# Patient Record
Sex: MALE | Race: BLACK OR AFRICAN AMERICAN | NOT HISPANIC OR LATINO | Employment: FULL TIME | URBAN - METROPOLITAN AREA
[De-identification: names, ages, dates, MRNs, and addresses within clinical notes are randomized per-mention and may not be internally consistent; named-entity substitution may affect disease eponyms.]

---

## 2017-01-03 ENCOUNTER — LAB VISIT (OUTPATIENT)
Dept: LAB | Facility: HOSPITAL | Age: 42
End: 2017-01-03
Attending: INTERNAL MEDICINE
Payer: COMMERCIAL

## 2017-01-03 DIAGNOSIS — M25.50 POLYARTHRALGIA: ICD-10-CM

## 2017-01-03 LAB
ALBUMIN SERPL BCP-MCNC: 3.1 G/DL
ALP SERPL-CCNC: 74 U/L
ALT SERPL W/O P-5'-P-CCNC: 11 U/L
ANION GAP SERPL CALC-SCNC: 9 MMOL/L
AST SERPL-CCNC: 8 U/L
BASOPHILS # BLD AUTO: 0.02 K/UL
BASOPHILS NFR BLD: 0.3 %
BILIRUB SERPL-MCNC: 0.6 MG/DL
BUN SERPL-MCNC: 14 MG/DL
CALCIUM SERPL-MCNC: 9.2 MG/DL
CHLORIDE SERPL-SCNC: 104 MMOL/L
CO2 SERPL-SCNC: 25 MMOL/L
CREAT SERPL-MCNC: 0.8 MG/DL
CRP SERPL-MCNC: 10.3 MG/L
DIFFERENTIAL METHOD: ABNORMAL
EOSINOPHIL # BLD AUTO: 0.2 K/UL
EOSINOPHIL NFR BLD: 2.1 %
ERYTHROCYTE [DISTWIDTH] IN BLOOD BY AUTOMATED COUNT: 16.4 %
ERYTHROCYTE [SEDIMENTATION RATE] IN BLOOD BY WESTERGREN METHOD: 23 MM/HR
EST. GFR  (AFRICAN AMERICAN): >60 ML/MIN/1.73 M^2
EST. GFR  (NON AFRICAN AMERICAN): >60 ML/MIN/1.73 M^2
GLUCOSE SERPL-MCNC: 70 MG/DL
HCT VFR BLD AUTO: 41.4 %
HGB BLD-MCNC: 13.6 G/DL
LYMPHOCYTES # BLD AUTO: 2.7 K/UL
LYMPHOCYTES NFR BLD: 36.4 %
MCH RBC QN AUTO: 28.4 PG
MCHC RBC AUTO-ENTMCNC: 32.9 %
MCV RBC AUTO: 86 FL
MONOCYTES # BLD AUTO: 0.5 K/UL
MONOCYTES NFR BLD: 6.3 %
NEUTROPHILS # BLD AUTO: 4.1 K/UL
NEUTROPHILS NFR BLD: 54.8 %
PLATELET # BLD AUTO: 273 K/UL
PMV BLD AUTO: 9.3 FL
POTASSIUM SERPL-SCNC: 3.8 MMOL/L
PROT SERPL-MCNC: 7.7 G/DL
RBC # BLD AUTO: 4.79 M/UL
SODIUM SERPL-SCNC: 138 MMOL/L
WBC # BLD AUTO: 7.48 K/UL

## 2017-01-03 PROCEDURE — 36415 COLL VENOUS BLD VENIPUNCTURE: CPT | Mod: PO

## 2017-01-03 PROCEDURE — 86140 C-REACTIVE PROTEIN: CPT

## 2017-01-03 PROCEDURE — 80053 COMPREHEN METABOLIC PANEL: CPT

## 2017-01-03 PROCEDURE — 85025 COMPLETE CBC W/AUTO DIFF WBC: CPT

## 2017-01-03 PROCEDURE — 83520 IMMUNOASSAY QUANT NOS NONAB: CPT

## 2017-01-03 PROCEDURE — 84165 PROTEIN E-PHORESIS SERUM: CPT | Mod: 26,,, | Performed by: PATHOLOGY

## 2017-01-03 PROCEDURE — 84165 PROTEIN E-PHORESIS SERUM: CPT

## 2017-01-03 PROCEDURE — 85651 RBC SED RATE NONAUTOMATED: CPT | Mod: PO

## 2017-01-04 LAB
ALBUMIN SERPL ELPH-MCNC: 3.41 G/DL
ALPHA1 GLOB SERPL ELPH-MCNC: 0.35 G/DL
ALPHA2 GLOB SERPL ELPH-MCNC: 0.74 G/DL
B-GLOBULIN SERPL ELPH-MCNC: 0.91 G/DL
GAMMA GLOB SERPL ELPH-MCNC: 2.11 G/DL
KAPPA LC SER QL IA: 4.68 MG/DL
KAPPA LC/LAMBDA SER IA: 1.7
LAMBDA LC SER QL IA: 2.76 MG/DL
PROT SERPL-MCNC: 7.5 G/DL

## 2017-01-06 LAB — PATHOLOGIST INTERPRETATION SPE: NORMAL

## 2017-01-25 RX ORDER — FOLIC ACID 1 MG/1
TABLET ORAL
Qty: 30 TABLET | Refills: 0 | Status: SHIPPED | OUTPATIENT
Start: 2017-01-25 | End: 2017-01-31 | Stop reason: SDUPTHER

## 2017-01-31 ENCOUNTER — TELEPHONE (OUTPATIENT)
Dept: RHEUMATOLOGY | Facility: CLINIC | Age: 42
End: 2017-01-31

## 2017-01-31 RX ORDER — FOLIC ACID 1 MG/1
1000 TABLET ORAL DAILY
Qty: 30 TABLET | Refills: 3 | Status: SHIPPED | OUTPATIENT
Start: 2017-01-31 | End: 2017-05-09 | Stop reason: SDUPTHER

## 2017-01-31 NOTE — TELEPHONE ENCOUNTER
----- Message from Malissa Ulloa sent at 1/31/2017  1:09 PM CST -----  Contact: self/home  Pt would like a refill on his folic acid medication to be called in to [facility] pharmacy.

## 2017-02-02 ENCOUNTER — TELEPHONE (OUTPATIENT)
Dept: RHEUMATOLOGY | Facility: CLINIC | Age: 42
End: 2017-02-02

## 2017-02-02 RX ORDER — PREDNISONE 20 MG/1
20 TABLET ORAL DAILY
Qty: 3 TABLET | Refills: 0 | Status: SHIPPED | OUTPATIENT
Start: 2017-02-02 | End: 2017-02-12

## 2017-02-02 NOTE — TELEPHONE ENCOUNTER
----- Message from Lauren Barney sent at 2/2/2017  9:07 AM CST -----  Contact: self@home  Pt called because he needs a refill on Prednisone.    Please call at home number when Rx has been sent at to pharmacy    Thank you

## 2017-02-22 RX ORDER — METHOTREXATE 2.5 MG/1
TABLET ORAL
Qty: 32 TABLET | Refills: 0 | Status: SHIPPED | OUTPATIENT
Start: 2017-02-22 | End: 2017-03-23 | Stop reason: SDUPTHER

## 2017-03-03 ENCOUNTER — HOSPITAL ENCOUNTER (OUTPATIENT)
Dept: RADIOLOGY | Facility: HOSPITAL | Age: 42
Discharge: HOME | End: 2017-03-03
Attending: INTERNAL MEDICINE
Payer: COMMERCIAL

## 2017-03-03 ENCOUNTER — OFFICE VISIT (OUTPATIENT)
Dept: RHEUMATOLOGY | Facility: CLINIC | Age: 42
End: 2017-03-03
Payer: COMMERCIAL

## 2017-03-03 VITALS
DIASTOLIC BLOOD PRESSURE: 69 MMHG | WEIGHT: 196.38 LBS | HEIGHT: 70 IN | HEART RATE: 87 BPM | BODY MASS INDEX: 28.12 KG/M2 | SYSTOLIC BLOOD PRESSURE: 117 MMHG

## 2017-03-03 DIAGNOSIS — M06.9 RHEUMATOID ARTHRITIS INVOLVING MULTIPLE JOINTS: Primary | ICD-10-CM

## 2017-03-03 DIAGNOSIS — D84.9 IMMUNOCOMPROMISED: ICD-10-CM

## 2017-03-03 DIAGNOSIS — M06.9 RHEUMATOID ARTHRITIS INVOLVING MULTIPLE JOINTS: ICD-10-CM

## 2017-03-03 PROCEDURE — 1160F RVW MEDS BY RX/DR IN RCRD: CPT | Mod: S$GLB,,, | Performed by: INTERNAL MEDICINE

## 2017-03-03 PROCEDURE — 71020 XR CHEST PA AND LATERAL: CPT | Mod: 26,,, | Performed by: RADIOLOGY

## 2017-03-03 PROCEDURE — 99999 PR PBB SHADOW E&M-EST. PATIENT-LVL III: CPT | Mod: PBBFAC,,, | Performed by: INTERNAL MEDICINE

## 2017-03-03 PROCEDURE — 71020 XR CHEST PA AND LATERAL: CPT | Mod: TC

## 2017-03-03 PROCEDURE — 99214 OFFICE O/P EST MOD 30 MIN: CPT | Mod: S$GLB,,, | Performed by: INTERNAL MEDICINE

## 2017-03-03 ASSESSMENT — ROUTINE ASSESSMENT OF PATIENT INDEX DATA (RAPID3)
AM STIFFNESS SCORE: 1, YES
FATIGUE SCORE: 0
PSYCHOLOGICAL DISTRESS SCORE: 0
MDHAQ FUNCTION SCORE: .8
PATIENT GLOBAL ASSESSMENT SCORE: 3
TOTAL RAPID3 SCORE: 3.22
PAIN SCORE: 4

## 2017-03-03 NOTE — PROGRESS NOTES
Subjective:       Patient ID: Dakotah Braun is a 40 y.o. male.    Chief Complaint: Pain    HPI     39 yo male with PMH of CTS release on right in April 2016 here for evaluation.   He reports about 2 months ago, he started to have pain in both feet.  He also has pain in knees, hands, wrists,elbows, and shoulders.  Pain level is 3/10 and can get up to a 5/10. Pain is described is aching. He takes advil 3 in morning and 3 at night with improvement.  He has stiffness in both shoulders, worse on right side.  Before his carpal tunnel surgery, he had pain and tingling in right thumb with improvement after the surgery.  Denies rashes. Reports dry eyes and dry mouth in last 3 months.  He reports that his eye doctor told him he had dry eyes.   He has had swelling in both hands. He denies swelling else where.  Thinks like right knee may get swollen but not sure. Denies any warmth in joints.  Denies smoking or drinking.  Lost 12 pounds on purpose.  He reports that food feels different due to dry mouth.          family hx: negative for lupus, sjogrens      Interval history: He is taking MTX 8 pills a week and folic acid once a day.  He reports morning stiffness for 30 minutes, improved pain.  Pain went from 6 to 4, in hands, feet, and knees.  He thinks his swelling has gone down.  He had flare of RA last month.  Prednisone improves pain. Using hands makes pain worse.        Review of Systems   Constitutional: Negative for fever, chills, appetite change and fatigue.   HENT: Negative for hearing loss, mouth sores, rhinorrhea, sinus pressure and trouble swallowing.    Eyes: Negative for photophobia, pain, discharge, itching and visual disturbance.   Respiratory: Negative for cough, chest tightness, wheezing and stridor.    Cardiovascular: Negative for chest pain and palpitations.   Gastrointestinal: Negative for blood in stool and abdominal distention.   Endocrine: Negative for cold intolerance and heat intolerance.    Genitourinary: Negative for dysuria, hematuria and flank pain.   Musculoskeletal: Positive for joint swelling and arthralgias. Negative for myalgias, back pain, gait problem, neck pain and neck stiffness.   Skin: Negative for color change, pallor and rash.   Neurological: Negative for dizziness, light-headedness, numbness and headaches.   Hematological: Negative for adenopathy. Does not bruise/bleed easily.   Psychiatric/Behavioral: Negative for decreased concentration and agitation. The patient is not nervous/anxious.          Objective:        Physical Exam   Vitals reviewed.  Constitutional: No distress.   HENT:   Head: Normocephalic and atraumatic.   Right Ear: External ear normal.   Left Ear: External ear normal.   Mouth/Throat: No oropharyngeal exudate.   Enlarged lacrimal glans bilaterally   Eyes: Conjunctivae and EOM are normal. Pupils are equal, round, and reactive to light. Right eye exhibits no discharge. Left eye exhibits no discharge. No scleral icterus.   Neck: Normal range of motion. Neck supple. No JVD present. No tracheal deviation present. No thyromegaly present.   Cardiovascular: Normal rate and regular rhythm.  Exam reveals no gallop and no friction rub.    No murmur heard.  Pulmonary/Chest: Effort normal and breath sounds normal. No stridor. No respiratory distress. He has no wheezes. He has no rales.   Abdominal: Soft. Bowel sounds are normal. He exhibits no distension. There is no tenderness. There is no rebound.   Lymphadenopathy:     He has no cervical adenopathy.   Skin: Skin is warm and dry. He is not diaphoretic.     Musculoskeletal: Normal range of motion.   FROM of all joints including neck, shoulders, spine, ankles, wrists, knees, and ankles; no joint deformities noted or effusions, erythema or warmth; no tophi or nodules noted; no crepitus; no synovitis noted in hands or feet; no nail pitting or onycholysis     Right elbow with small nodule in bursa       Labs:  Deedee:  1:160  Ccp>1200  Rf>30  Ro,la-negative  Esr-80  crp-36  Hep B-negative  Hepatitis C-negative  hiv-negative     Arthritis survey (2016):  Findings: C-spine demonstrates no instability.  Right and left knees demonstrate no erosions.  Right left hands and wrists demonstrate no erosions.  Right and left feet demonstrate no erosions.      Assessment:       .       42 yo male with PMH of CTS release on right in April 2016 here for follow up of seropositive RA.    On MTX 8 pills a week but still having synovitis and flares so will add Humira.      He has sicca symptoms but no antibodies for sjogrens. Suspect likely from high inflammation.  He stopped pilocarpine because he said BID dosing didn't work and stopped restasis because it made his eyes burn.    Plan:     Start Humira 40mg subq every 14 days   (Risks of TNF inhibitor discussed with patient and not limited to cell count abnormalities, malignancy, allergic  reaction to medication, and increased risk of infection. Patient agrees with starting medication.)      -continue MTX 8 pills a week  -continue folic acid 1mg po qday  -off  meloxicam 15 mg po day  - chest xray today  Labs including quant gold on tuesday  *

## 2017-03-06 ENCOUNTER — DOCUMENTATION ONLY (OUTPATIENT)
Dept: PHARMACY | Facility: CLINIC | Age: 42
End: 2017-03-06

## 2017-03-06 ENCOUNTER — TELEPHONE (OUTPATIENT)
Dept: PHARMACY | Facility: CLINIC | Age: 42
End: 2017-03-06

## 2017-03-06 NOTE — PROGRESS NOTES
[facility] received prescription for Humira injection that requires prior authorization.

## 2017-03-07 ENCOUNTER — LAB VISIT (OUTPATIENT)
Dept: LAB | Facility: HOSPITAL | Age: 42
End: 2017-03-07
Attending: INTERNAL MEDICINE
Payer: COMMERCIAL

## 2017-03-07 DIAGNOSIS — M06.9 RHEUMATOID ARTHRITIS INVOLVING MULTIPLE JOINTS: ICD-10-CM

## 2017-03-07 LAB
ALBUMIN SERPL BCP-MCNC: 3.1 G/DL
ALP SERPL-CCNC: 82 U/L
ALT SERPL W/O P-5'-P-CCNC: 16 U/L
ANION GAP SERPL CALC-SCNC: 6 MMOL/L
AST SERPL-CCNC: 17 U/L
BASOPHILS # BLD AUTO: 0.02 K/UL
BASOPHILS NFR BLD: 0.3 %
BILIRUB SERPL-MCNC: 0.6 MG/DL
BUN SERPL-MCNC: 13 MG/DL
CALCIUM SERPL-MCNC: 8.9 MG/DL
CHLORIDE SERPL-SCNC: 105 MMOL/L
CO2 SERPL-SCNC: 27 MMOL/L
CREAT SERPL-MCNC: 1 MG/DL
CRP SERPL-MCNC: 9.4 MG/L
DIFFERENTIAL METHOD: ABNORMAL
EOSINOPHIL # BLD AUTO: 0.2 K/UL
EOSINOPHIL NFR BLD: 3.9 %
ERYTHROCYTE [DISTWIDTH] IN BLOOD BY AUTOMATED COUNT: 14 %
ERYTHROCYTE [SEDIMENTATION RATE] IN BLOOD BY WESTERGREN METHOD: 18 MM/HR
EST. GFR  (AFRICAN AMERICAN): >60 ML/MIN/1.73 M^2
EST. GFR  (NON AFRICAN AMERICAN): >60 ML/MIN/1.73 M^2
GLUCOSE SERPL-MCNC: 78 MG/DL
HCT VFR BLD AUTO: 40.7 %
HGB BLD-MCNC: 13.6 G/DL
LYMPHOCYTES # BLD AUTO: 2.2 K/UL
LYMPHOCYTES NFR BLD: 36.5 %
MCH RBC QN AUTO: 29.9 PG
MCHC RBC AUTO-ENTMCNC: 33.4 %
MCV RBC AUTO: 90 FL
MONOCYTES # BLD AUTO: 0.5 K/UL
MONOCYTES NFR BLD: 9 %
NEUTROPHILS # BLD AUTO: 3 K/UL
NEUTROPHILS NFR BLD: 50.1 %
PLATELET # BLD AUTO: 255 K/UL
PMV BLD AUTO: 9.1 FL
POTASSIUM SERPL-SCNC: 4 MMOL/L
PROT SERPL-MCNC: 7.6 G/DL
RBC # BLD AUTO: 4.55 M/UL
SODIUM SERPL-SCNC: 138 MMOL/L
WBC # BLD AUTO: 5.91 K/UL

## 2017-03-07 PROCEDURE — 36415 COLL VENOUS BLD VENIPUNCTURE: CPT | Mod: PO

## 2017-03-07 PROCEDURE — 85651 RBC SED RATE NONAUTOMATED: CPT | Mod: PO

## 2017-03-07 PROCEDURE — 86140 C-REACTIVE PROTEIN: CPT

## 2017-03-07 PROCEDURE — 85025 COMPLETE CBC W/AUTO DIFF WBC: CPT

## 2017-03-07 PROCEDURE — 80053 COMPREHEN METABOLIC PANEL: CPT

## 2017-03-10 ENCOUNTER — TELEPHONE (OUTPATIENT)
Dept: PHARMACY | Facility: CLINIC | Age: 42
End: 2017-03-10

## 2017-03-10 DIAGNOSIS — M06.9 RHEUMATOID ARTHRITIS INVOLVING MULTIPLE JOINTS: Primary | ICD-10-CM

## 2017-03-10 NOTE — TELEPHONE ENCOUNTER
Documentation Only    Humira Pen 40 mg approved 2-8-17 through 6-8-17  PA# [id number]      Humira Co Pay Card    [id number]  RXBIN [id number]  RXPCN [id number]  RX Group [id number]

## 2017-03-13 ENCOUNTER — TELEPHONE (OUTPATIENT)
Dept: RHEUMATOLOGY | Facility: CLINIC | Age: 42
End: 2017-03-13

## 2017-03-13 NOTE — TELEPHONE ENCOUNTER
"----- Message from Jose Angel Teague PharmD sent at 3/10/2017  3:59 PM CST -----  When "rerouting" once it has already been sent to [facility], the order must be discontinued and re-keyed and routed to the new pharmacy. If you click "reorder" or "change" and change the pharmacy..the rx will continue to come to [facility] even if you change the pharmacy. This is directly related to the initial BPA that is fired that opt's the patient into [facility] by default. To circumvent that...the rx must be retyped and rerouted.    Jose Angel Teague PharmD, BCPS [facility]  [phone]    ----- Message -----     From: Yeimi Pastor MD     Sent: 3/10/2017   3:56 PM       To: Jose Angel Teague PharmD, Seble Melendez, #     Hi Sammi and Jose Angel    Every time I send a script to [facility], it routes it back to our specialty pharmacy.  Seble, I think this may be an epic glitch since it has happened several times.  Can you investigate this with me?  Thank you.    Dr. Pastor    "

## 2017-03-24 RX ORDER — METHOTREXATE 2.5 MG/1
TABLET ORAL
Qty: 32 TABLET | Refills: 0 | Status: SHIPPED | OUTPATIENT
Start: 2017-03-24 | End: 2017-04-19 | Stop reason: SDUPTHER

## 2017-03-29 ENCOUNTER — TELEPHONE (OUTPATIENT)
Dept: RHEUMATOLOGY | Facility: CLINIC | Age: 42
End: 2017-03-29

## 2017-04-19 RX ORDER — METHOTREXATE 2.5 MG/1
TABLET ORAL
Qty: 32 TABLET | Refills: 0 | Status: SHIPPED | OUTPATIENT
Start: 2017-04-19 | End: 2017-05-05

## 2017-04-21 ENCOUNTER — TELEPHONE (OUTPATIENT)
Dept: INTERNAL MEDICINE | Facility: CLINIC | Age: 42
End: 2017-04-21

## 2017-04-21 NOTE — TELEPHONE ENCOUNTER
Left message for pt confirming fax of records including last office note from old clinic sent to Harman Peña ATTJW Burks per pt request, [phone].

## 2017-04-26 ENCOUNTER — TELEPHONE (OUTPATIENT)
Dept: INTERNAL MEDICINE | Facility: CLINIC | Age: 42
End: 2017-04-26

## 2017-04-26 NOTE — TELEPHONE ENCOUNTER
S/w pt, r/s 05/08/17 appt with Dr. Pop to 05/01/17 d/t pt needing documentation/letter for disability office per their request for pt current work status. Pt conf appt time.

## 2017-04-26 NOTE — TELEPHONE ENCOUNTER
----- Message from Michelle Heard sent at 4/26/2017  2:26 PM CDT -----  Pt is requesting to speak with the nurse (Adele) in regards to a claim he is trying to fill out and information that he is needing from you all for the claim./ Pt can be reached at [phone] (home)

## 2017-05-05 ENCOUNTER — OFFICE VISIT (OUTPATIENT)
Dept: INTERNAL MEDICINE | Facility: CLINIC | Age: 42
End: 2017-05-05
Payer: COMMERCIAL

## 2017-05-05 VITALS
RESPIRATION RATE: 16 BRPM | HEIGHT: 70 IN | DIASTOLIC BLOOD PRESSURE: 72 MMHG | SYSTOLIC BLOOD PRESSURE: 102 MMHG | BODY MASS INDEX: 28.37 KG/M2 | WEIGHT: 198.19 LBS | TEMPERATURE: 97 F | HEART RATE: 72 BPM | OXYGEN SATURATION: 99 %

## 2017-05-05 DIAGNOSIS — M05.772 RHEUMATOID ARTHRITIS INVOLVING BOTH FEET WITH POSITIVE RHEUMATOID FACTOR: Chronic | ICD-10-CM

## 2017-05-05 DIAGNOSIS — M65.9 TENOSYNOVITIS OF FINGERS: Chronic | ICD-10-CM

## 2017-05-05 DIAGNOSIS — M05.742 RHEUMATOID ARTHRITIS INVOLVING BOTH HANDS WITH POSITIVE RHEUMATOID FACTOR: Chronic | ICD-10-CM

## 2017-05-05 DIAGNOSIS — M05.771 RHEUMATOID ARTHRITIS INVOLVING BOTH FEET WITH POSITIVE RHEUMATOID FACTOR: Chronic | ICD-10-CM

## 2017-05-05 DIAGNOSIS — M05.741 RHEUMATOID ARTHRITIS INVOLVING BOTH HANDS WITH POSITIVE RHEUMATOID FACTOR: Chronic | ICD-10-CM

## 2017-05-05 PROBLEM — M65.949 TENOSYNOVITIS OF FINGERS: Chronic | Status: ACTIVE | Noted: 2017-05-05

## 2017-05-05 PROCEDURE — 1160F RVW MEDS BY RX/DR IN RCRD: CPT | Mod: S$GLB,,, | Performed by: FAMILY MEDICINE

## 2017-05-05 PROCEDURE — 99999 PR PBB SHADOW E&M-EST. PATIENT-LVL IV: CPT | Mod: PBBFAC,,, | Performed by: FAMILY MEDICINE

## 2017-05-05 PROCEDURE — 99214 OFFICE O/P EST MOD 30 MIN: CPT | Mod: S$GLB,,, | Performed by: FAMILY MEDICINE

## 2017-05-05 RX ORDER — PREDNISONE 20 MG/1
20 TABLET ORAL
COMMUNITY

## 2017-05-05 RX ORDER — METHOTREXATE 2.5 MG/1
17.5 TABLET ORAL
COMMUNITY

## 2017-05-05 NOTE — MR AVS SNAPSHOT
[facility]  [address] [place] [address]  Phone: [phone]  Fax: [phone]                  Dakotah Braun   2017 3:00 PM   Office Visit    Description:  Male : 1975   Provider:  LUIGI Pop MD   Department:  [facility]           Reason for Visit     Work Status Letter     Finger Pain           Diagnoses this Visit        Comments    Tenosynovitis of fingers         Rheumatoid arthritis involving both feet with positive rheumatoid factor         Rheumatoid arthritis involving both hands with positive rheumatoid factor                To Do List           Future Appointments        Provider Department Dept Phone    2017 4:00 PM Yeimi Pastor MD [facility] Rheumatology [phone]      Goals (5 Years of Data)     None      Follow-Up and Disposition     Return for as needed to re-evaluate chronic conditions.      [facility]     [facility] Care Line -  Assistance  Unless otherwise directed by your provider, please contact [facility], our nurse care line that is available for  assistance.     Registered nurses in the [facility] provide: appointment scheduling, clinical advisement, health education, and other advisory services.  Call: [phone] (toll free)               Medications           Message regarding Medications     Verify the changes and/or additions to your medication regime listed below are the same as discussed with your clinician today.  If any of these changes or additions are incorrect, please notify your healthcare provider.        STOP taking these medications     cycloSPORINE (RESTASIS) 0.05 % ophthalmic emulsion Place 0.4 mLs (1 drop total) into both eyes 2 (two) times daily.    L. ACIDOPHILUS/PECTIN, CITRUS (ACIDOPHILUS PROBIOTIC ORAL) Take 1 capsule by mouth once daily.    omega-3 fatty acids-vitamin E (FISH OIL) 1,000 mg Cap Take 3 capsules by mouth once daily.          "  Verify that the below list of medications is an accurate representation of the medications you are currently taking.  If none reported, the list may be blank. If incorrect, please contact your healthcare provider. Carry this list with you in case of emergency.           Current Medications     adalimumab (HUMIRA PEN) PnKt injection Inject 0.8 mLs (40 mg total) into the skin every 14 (fourteen) days.    folic acid (FOLVITE) 1 MG tablet Take 1 tablet (1,000 mcg total) by mouth once daily.    methotrexate 2.5 MG Tab Take 17.5 mg by mouth every 7 days.    predniSONE (DELTASONE) 20 MG tablet Take 20 mg by mouth as needed.    pilocarpine (SALAGEN) 5 MG Tab Take 1 tablet (5 mg total) by mouth 2 (two) times daily.           Clinical Reference Information           Your Vitals Were     BP Pulse Temp Resp    102/72 (BP Location: Right arm, Patient Position: Sitting, BP Method: Manual) 72 96.9 °F (36.1 °C) (Tympanic) 16    Height Weight SpO2 BMI    5' 10" (1.778 m) 89.9 kg (198 lb 3.1 oz) 99% 28.44 kg/m2      Blood Pressure          Most Recent Value    BP  102/72      Allergies as of 5/5/2017     [place]    Thomas [Cherries]      Immunizations Administered on Date of Encounter - 5/5/2017     None      Orders Placed During Today's Visit      Normal Orders This Visit    Ambulatory Referral to Physical/Occupational Therapy       Language Assistance Services     ATTENTION: Language assistance services are available, free of charge. Please call [phone].      ATENCIÓN: Si habla español, tiene a tirado disposición servicios gratuitos de asistencia lingüística. Llame al [phone].     [facility] Ý: N?u b?n nói Ti?ng Vi?t, có các d?ch v? h? tr? ngôn ng? mi?n phí dành cho b?n. G?i s? [phone].         [facility] complies with applicable Federal civil rights laws and does not discriminate on the basis of race, color, national origin, age, disability, or sex.        "

## 2017-05-08 ENCOUNTER — PATIENT MESSAGE (OUTPATIENT)
Dept: INTERNAL MEDICINE | Facility: CLINIC | Age: 42
End: 2017-05-08

## 2017-05-09 ENCOUNTER — OFFICE VISIT (OUTPATIENT)
Dept: RHEUMATOLOGY | Facility: CLINIC | Age: 42
End: 2017-05-09
Payer: COMMERCIAL

## 2017-05-09 VITALS
HEART RATE: 67 BPM | BODY MASS INDEX: 28.26 KG/M2 | HEIGHT: 70 IN | SYSTOLIC BLOOD PRESSURE: 99 MMHG | DIASTOLIC BLOOD PRESSURE: 64 MMHG | WEIGHT: 197.38 LBS

## 2017-05-09 DIAGNOSIS — D84.9 IMMUNOCOMPROMISED: ICD-10-CM

## 2017-05-09 DIAGNOSIS — M06.9 RHEUMATOID ARTHRITIS INVOLVING MULTIPLE JOINTS: Primary | ICD-10-CM

## 2017-05-09 PROCEDURE — 99214 OFFICE O/P EST MOD 30 MIN: CPT | Mod: S$GLB,,, | Performed by: INTERNAL MEDICINE

## 2017-05-09 PROCEDURE — 99999 PR PBB SHADOW E&M-EST. PATIENT-LVL III: CPT | Mod: PBBFAC,,, | Performed by: INTERNAL MEDICINE

## 2017-05-09 PROCEDURE — 1160F RVW MEDS BY RX/DR IN RCRD: CPT | Mod: S$GLB,,, | Performed by: INTERNAL MEDICINE

## 2017-05-09 RX ORDER — FOLIC ACID 1 MG/1
1000 TABLET ORAL DAILY
Qty: 30 TABLET | Refills: 3 | Status: SHIPPED | OUTPATIENT
Start: 2017-05-09 | End: 2017-11-03 | Stop reason: SDUPTHER

## 2017-05-09 RX ORDER — PREDNISONE 10 MG/1
10 TABLET ORAL DAILY
Qty: 90 TABLET | Refills: 0 | Status: SHIPPED | OUTPATIENT
Start: 2017-05-09 | End: 2017-05-19

## 2017-05-09 RX ORDER — METHOTREXATE 2.5 MG/1
20 TABLET ORAL
Qty: 32 TABLET | Refills: 2 | Status: SHIPPED | OUTPATIENT
Start: 2017-05-09 | End: 2017-08-03 | Stop reason: SDUPTHER

## 2017-05-09 NOTE — PROGRESS NOTES
Subjective:       Patient ID: Dakotah Braun is a 40 y.o. male.    Chief Complaint: Pain    HPI     41 yo male with PMH of CTS release on right in April 2016 here for evaluation.   He reports about 2 months ago, he started to have pain in both feet.  He also has pain in knees, hands, wrists,elbows, and shoulders.  Pain level is 3/10 and can get up to a 5/10. Pain is described is aching. He takes advil 3 in morning and 3 at night with improvement.  He has stiffness in both shoulders, worse on right side.  Before his carpal tunnel surgery, he had pain and tingling in right thumb with improvement after the surgery.  Denies rashes. Reports dry eyes and dry mouth in last 3 months.  He reports that his eye doctor told him he had dry eyes.   He has had swelling in both hands. He denies swelling else where.  Thinks like right knee may get swollen but not sure. Denies any warmth in joints.  Denies smoking or drinking.  Lost 12 pounds on purpose.  He reports that food feels different due to dry mouth.          family hx: negative for lupus, sjogrens      Interval history:  He started Humira at end of March.  He is taking MTX 8 pills a week and folic acid once a day.  He reports morning stiffness for 30 minutes, improved pain.  Pain level is 2/10. He has swelling in left hand and right third pip.  Pain went from 6 to 4, in hands, feet, and knees.  He thinks his swelling has gone down.  He had flare of RA last month.   Using hands makes pain worse.        Review of Systems   Constitutional: Negative for fever, chills, appetite change and fatigue.   HENT: Negative for hearing loss, mouth sores, rhinorrhea, sinus pressure and trouble swallowing.    Eyes: Negative for photophobia, pain, discharge, itching and visual disturbance.   Respiratory: Negative for cough, chest tightness, wheezing and stridor.    Cardiovascular: Negative for chest pain and palpitations.   Gastrointestinal: Negative for blood in stool and abdominal  distention.   Endocrine: Negative for cold intolerance and heat intolerance.   Genitourinary: Negative for dysuria, hematuria and flank pain.   Musculoskeletal: Positive for joint swelling and arthralgias. Negative for myalgias, back pain, gait problem, neck pain and neck stiffness.   Skin: Negative for color change, pallor and rash.   Neurological: Negative for dizziness, light-headedness, numbness and headaches.   Hematological: Negative for adenopathy. Does not bruise/bleed easily.   Psychiatric/Behavioral: Negative for decreased concentration and agitation. The patient is not nervous/anxious.          Objective:        Physical Exam   Vitals reviewed.  Constitutional: No distress.   HENT:   Head: Normocephalic and atraumatic.   Right Ear: External ear normal.   Left Ear: External ear normal.   Mouth/Throat: No oropharyngeal exudate.   Enlarged lacrimal glans bilaterally   Eyes: Conjunctivae and EOM are normal. Pupils are equal, round, and reactive to light. Right eye exhibits no discharge. Left eye exhibits no discharge. No scleral icterus.   Neck: Normal range of motion. Neck supple. No JVD present. No tracheal deviation present. No thyromegaly present.   Cardiovascular: Normal rate and regular rhythm.  Exam reveals no gallop and no friction rub.    No murmur heard.  Pulmonary/Chest: Effort normal and breath sounds normal. No stridor. No respiratory distress. He has no wheezes. He has no rales.   Abdominal: Soft. Bowel sounds are normal. He exhibits no distension. There is no tenderness. There is no rebound.   Lymphadenopathy:     He has no cervical adenopathy.   Skin: Skin is warm and dry. He is not diaphoretic.     Musculoskeletal: Normal range of motion.   FROM of all joints including neck, shoulders, spine, ankles, wrists, knees, and ankles; no joint deformities noted or effusions, erythema or warmth; no tophi or nodules noted; no crepitus; no synovitis noted in hands or feet; no nail pitting or onycholysis      Right elbow with small nodule in bursa       Labs:  Deedee: 1:160  Ccp>1200  Rf>30  Ro,la-negative  Esr-80  crp-36  Hep B-negative  Hepatitis C-negative  hiv-negative     Arthritis survey (2016):  Findings: C-spine demonstrates no instability.  Right and left knees demonstrate no erosions.  Right left hands and wrists demonstrate no erosions.  Right and left feet demonstrate no erosions.      Assessment:       .       40 yo male with PMH of CTS release on right in April 2016 here for follow up of seropositive RA.    He is on MTX 8 pills of week and Humira but is still having flares.  Patient reports that he cannot work full time due to his hands on job and pain in feet.  He has sicca symptoms but no antibodies for sjogrens. Suspect likely from high inflammation.  He stopped pilocarpine because he said BID dosing didn't work and stopped restasis because it made his eyes burn.    Plan:     Continue  Humira 40mg subq every 14 days   -continue MTX 8 pills a week  -continue folic acid 1mg po qday  -off  meloxicam 15 mg po day  - labs today  Asked him to try taking prednisone 10mg a day and see if it makes a big difference in his feet pain  DISCUSSED TRYING REMICADE VS ORENCIA INFUSIONS at next visit.    *

## 2017-05-09 NOTE — MR AVS SNAPSHOT
[facility]  [address]  [facility] [address]  Phone: [phone]  Fax: [phone]                  Dakotah Braun   2017 4:00 PM   Office Visit    Description:  Male : 1975   Provider:  Yeimi Pastor MD   Department:  [facility]           Reason for Visit     Follow-up           Diagnoses this Visit        Comments    Rheumatoid arthritis involving multiple joints    -  Primary            To Do List           Future Appointments        Provider Department Dept Phone    2017 5:10 PM LAB, SAME DAY [facility] [phone]    7/10/2017 4:00 PM Yeimi Pastor MD [facility] [phone]      Goals (5 Years of Data)     None       These Medications        Disp Refills Start End    methotrexate 2.5 MG Tab 32 tablet 2 2017    Take 8 tablets (20 mg total) by mouth every 7 days. - Oral    Pharmacy: [address] Ph #: [phone]       folic acid (FOLVITE) 1 MG tablet 30 tablet 3 2017     Take 1 tablet (1,000 mcg total) by mouth once daily. - Oral    Pharmacy: [address] Ph #: [phone]       predniSONE (DELTASONE) 10 MG tablet 90 tablet 0 2017    Take 1 tablet (10 mg total) by mouth once daily. - Oral    Pharmacy: [address] Ph #: [phone]         [facility]     [facility] Nurse Care Line -  Assistance  Unless otherwise directed by your provider, please contact [facility], our nurse care line that is available for  assistance.     Registered nurses in the [facility] provide: appointment scheduling, clinical advisement, health education, and other advisory services.  Call: [phone] (toll free)               Medications           Message  "regarding Medications     Verify the changes and/or additions to your medication regime listed below are the same as discussed with your clinician today.  If any of these changes or additions are incorrect, please notify your healthcare provider.        START taking these NEW medications        Refills    methotrexate 2.5 MG Tab 2    Sig: Take 8 tablets (20 mg total) by mouth every 7 days.    Class: Normal    Route: Oral    predniSONE (DELTASONE) 10 MG tablet 0    Sig: Take 1 tablet (10 mg total) by mouth once daily.    Class: Normal    Route: Oral           Verify that the below list of medications is an accurate representation of the medications you are currently taking.  If none reported, the list may be blank. If incorrect, please contact your healthcare provider. Carry this list with you in case of emergency.           Current Medications     adalimumab (HUMIRA PEN) PnKt injection Inject 0.8 mLs (40 mg total) into the skin every 14 (fourteen) days.    folic acid (FOLVITE) 1 MG tablet Take 1 tablet (1,000 mcg total) by mouth once daily.    methotrexate 2.5 MG Tab Take 17.5 mg by mouth every 7 days.    predniSONE (DELTASONE) 20 MG tablet Take 20 mg by mouth as needed.    methotrexate 2.5 MG Tab Take 8 tablets (20 mg total) by mouth every 7 days.    pilocarpine (SALAGEN) 5 MG Tab Take 1 tablet (5 mg total) by mouth 2 (two) times daily.    predniSONE (DELTASONE) 10 MG tablet Take 1 tablet (10 mg total) by mouth once daily.           Clinical Reference Information           Your Vitals Were     BP Pulse Height Weight BMI    99/64 (BP Location: Left arm, Patient Position: Sitting, BP Method: Automatic) 67 5' 10" (1.778 m) 89.5 kg (197 lb 6.4 oz) 28.32 kg/m2      Blood Pressure          Most Recent Value    BP  99/64      Allergies as of 5/9/2017     [place]    Thomas [Cherries]      Immunizations Administered on Date of Encounter - 5/9/2017     None      Orders Placed During Today's Visit     Future Labs/Procedures " Expected by Expires    C-reactive protein  5/9/2017 7/8/2018    CBC auto differential  5/9/2017 7/8/2018    Comprehensive metabolic panel  5/9/2017 7/8/2018    Sedimentation rate, manual  5/9/2017 7/8/2018      Language Assistance Services     ATTENTION: Language assistance services are available, free of charge. Please call [phone].      ATENCIÓN: Si habla español, tiene a tirado disposición servicios gratuitos de asistencia lingüística. Llame al [phone].     CHÚ Ý: N?u b?n nói Ti?ng Vi?t, có các d?ch v? h? tr? ngôn ng? mi?n phí dành cho b?n. G?i s? [phone].         [facility] complies with applicable Federal civil rights laws and does not discriminate on the basis of race, color, national origin, age, disability, or sex.

## 2017-05-10 ENCOUNTER — PATIENT MESSAGE (OUTPATIENT)
Dept: RHEUMATOLOGY | Facility: CLINIC | Age: 42
End: 2017-05-10

## 2017-05-10 ENCOUNTER — PATIENT MESSAGE (OUTPATIENT)
Dept: INTERNAL MEDICINE | Facility: CLINIC | Age: 42
End: 2017-05-10

## 2017-05-10 ENCOUNTER — TELEPHONE (OUTPATIENT)
Dept: INTERNAL MEDICINE | Facility: CLINIC | Age: 42
End: 2017-05-10

## 2017-05-10 NOTE — TELEPHONE ENCOUNTER
Per pt request, faxed 05/10/2017 status letter with contact coversheet STACEY Burks with [facility], F#[phone], fax transmission confirmed.

## 2017-05-15 ENCOUNTER — PATIENT MESSAGE (OUTPATIENT)
Dept: INTERNAL MEDICINE | Facility: CLINIC | Age: 42
End: 2017-05-15

## 2017-05-16 ENCOUNTER — TELEPHONE (OUTPATIENT)
Dept: INTERNAL MEDICINE | Facility: CLINIC | Age: 42
End: 2017-05-16

## 2017-05-16 NOTE — TELEPHONE ENCOUNTER
Per pt request, faxed 05/05/17 PT referral to [facility] on [address], F#[phone], fax transmission confirmed and pt Pervaciohart message sent confirming.

## 2017-05-16 NOTE — PROGRESS NOTES
NOTE: Documentation entered by me for this encounter was done in part using speech-recognition technology. Although I have made an effort to ensure accuracy, malapropisms may exist and should be interpreted in context. -LUIGI Pop MD    Patient ID: Dakotah Braun is a 41 y.o. male.    CHIEF COMPLAINT   Rheumatoid Arthritis      HISTORY OF PRESENT ILLNESS:   Rheumatoid arthritis involving both hands with positive rheumatoid factor  Based on the report of the patient and information available to me at present, this condition appears to be SUB-OPTIMALLY (OR EQUIVOCALLY) CONTROLLED, and this condition appears to be IMPROVED.     Rheumatoid arthritis involving both feet with positive rheumatoid factor  Based on the report of the patient and information available to me at present, this condition appears to be SUB-OPTIMALLY (OR EQUIVOCALLY) CONTROLLED, and this condition appears to be IMPROVED.     Tenosynovitis of fingers  Based on the report of the patient and information available to me at present, this condition appears to be SUB-OPTIMALLY (OR EQUIVOCALLY) CONTROLLED, and this condition appears to be WORSE. QUALITY described as a painful joint swelling. ASSOCIATED symptoms include decreased range of motion of joints. LOCATION is Right middle finger > left ring finger > left index finger - all with restricted flexion ROM. SEVERITY described as MODERATE. ALLEVIATING factors include corticosteroids.      REVIEW OF SYSTEMS:   CONSTITUTIONAL: No fever or involuntary weight loss reported.  CARDIOVASCULAR: No angina or angina equivalent symptoms reported.  PULMONARY: No hemoptysis or trouble breathing reported.    PHYSICAL EXAM:   CONSTITUTIONAL: Vital signs (BP, P, T, RR, et al) noted. No apparent distress. Does not appear acutely ill or septic. Appears adequately hydrated.  HEENT: Normocephalic. Atraumatic. External ears unremarkable. Oropharynx moist.  PULM: Lungs clear. Breathing unlabored.  CV: Heart  regular.  DERM: Warm and moist.  PSYCHIATRIC: Alert and oriented x 3. Mood is grossly neutral. Affect appropriate. Judgment and insight not grossly compromised.  MUSCULOSKELETAL: Grossly normal stance and gait. MILD to MODERATE joint inflammation of hands and fingers, with decreased flexion range of motion of right middle finger > left ring finger > left index finger.    ASSESSMENT:       1. Tenosynovitis of fingers    2. Rheumatoid arthritis involving both feet with positive rheumatoid factor    3. Rheumatoid arthritis involving both hands with positive rheumatoid factor           PLAN:   Tenosynovitis of fingers  -     Ambulatory Referral to Physical/Occupational Therapy    Rheumatoid arthritis involving both feet with positive rheumatoid factor    Rheumatoid arthritis involving both hands with positive rheumatoid factor  -     Ambulatory Referral to Physical/Occupational Therapy        Medications Discontinued During This Encounter   Medication Reason    cycloSPORINE (RESTASIS) 0.05 % ophthalmic emulsion Therapy completed    L. ACIDOPHILUS/PECTIN, CITRUS (ACIDOPHILUS PROBIOTIC ORAL) Patient no longer taking    methotrexate 2.5 MG Tab     omega-3 fatty acids-vitamin E (FISH OIL) 1,000 mg Cap Patient no longer taking

## 2017-05-16 NOTE — ASSESSMENT & PLAN NOTE
Based on the report of the patient and information available to me at present, this condition appears to be SUB-OPTIMALLY (OR EQUIVOCALLY) CONTROLLED, and this condition appears to be WORSE. QUALITY described as a painful joint swelling. ASSOCIATED symptoms include decreased range of motion of joints. LOCATION is Right middle finger > left ring finger > left index finger - all with restricted flexion ROM. SEVERITY described as MODERATE. ALLEVIATING factors include corticosteroids.

## 2017-05-16 NOTE — ASSESSMENT & PLAN NOTE
Based on the report of the patient and information available to me at present, this condition appears to be SUB-OPTIMALLY (OR EQUIVOCALLY) CONTROLLED, and this condition appears to be IMPROVED.

## 2017-05-23 ENCOUNTER — PATIENT MESSAGE (OUTPATIENT)
Dept: INTERNAL MEDICINE | Facility: CLINIC | Age: 42
End: 2017-05-23

## 2017-05-23 ENCOUNTER — TELEPHONE (OUTPATIENT)
Dept: INTERNAL MEDICINE | Facility: CLINIC | Age: 42
End: 2017-05-23

## 2017-05-23 NOTE — TELEPHONE ENCOUNTER
Per pt [employer] request, faxed Functional Status Assessment (see letter 05/10/17) ATTJW Bynum, Workforce  with [employer], F#[phone], fax transmission confirmed. Pt informed via [employer].

## 2017-06-08 ENCOUNTER — PATIENT MESSAGE (OUTPATIENT)
Dept: INTERNAL MEDICINE | Facility: CLINIC | Age: 42
End: 2017-06-08

## 2017-06-08 ENCOUNTER — PATIENT MESSAGE (OUTPATIENT)
Dept: RHEUMATOLOGY | Facility: CLINIC | Age: 42
End: 2017-06-08

## 2017-06-13 ENCOUNTER — DOCUMENTATION ONLY (OUTPATIENT)
Dept: INTERNAL MEDICINE | Facility: CLINIC | Age: 42
End: 2017-06-13

## 2017-06-13 DIAGNOSIS — Z59.7 INSUFFICIENT SOCIAL INSURANCE AND WELFARE SUPPORT: Primary | ICD-10-CM

## 2017-06-13 SDOH — SOCIAL DETERMINANTS OF HEALTH (SDOH): INSUFFICIENT SOCIAL INSURANCE AND WELFARE SUPPORT: Z59.7

## 2017-06-30 ENCOUNTER — PATIENT MESSAGE (OUTPATIENT)
Dept: RHEUMATOLOGY | Facility: CLINIC | Age: 42
End: 2017-06-30

## 2017-06-30 ENCOUNTER — PATIENT MESSAGE (OUTPATIENT)
Dept: INTERNAL MEDICINE | Facility: CLINIC | Age: 42
End: 2017-06-30

## 2017-07-03 ENCOUNTER — PATIENT MESSAGE (OUTPATIENT)
Dept: RHEUMATOLOGY | Facility: CLINIC | Age: 42
End: 2017-07-03

## 2017-07-07 ENCOUNTER — TELEPHONE (OUTPATIENT)
Dept: RHEUMATOLOGY | Facility: CLINIC | Age: 42
End: 2017-07-07

## 2017-07-10 ENCOUNTER — TELEPHONE (OUTPATIENT)
Dept: RHEUMATOLOGY | Facility: CLINIC | Age: 42
End: 2017-07-10

## 2017-07-11 ENCOUNTER — OFFICE VISIT (OUTPATIENT)
Dept: RHEUMATOLOGY | Facility: CLINIC | Age: 42
End: 2017-07-11
Payer: MEDICAID

## 2017-07-11 VITALS
HEIGHT: 70 IN | DIASTOLIC BLOOD PRESSURE: 74 MMHG | SYSTOLIC BLOOD PRESSURE: 116 MMHG | WEIGHT: 204.63 LBS | RESPIRATION RATE: 18 BRPM | BODY MASS INDEX: 29.3 KG/M2 | HEART RATE: 83 BPM

## 2017-07-11 DIAGNOSIS — M06.9 RHEUMATOID ARTHRITIS INVOLVING MULTIPLE JOINTS: Primary | ICD-10-CM

## 2017-07-11 DIAGNOSIS — D84.9 IMMUNOCOMPROMISED: ICD-10-CM

## 2017-07-11 PROCEDURE — 99999 PR PBB SHADOW E&M-EST. PATIENT-LVL III: CPT | Mod: PBBFAC,,, | Performed by: INTERNAL MEDICINE

## 2017-07-11 PROCEDURE — 99213 OFFICE O/P EST LOW 20 MIN: CPT | Mod: PBBFAC | Performed by: INTERNAL MEDICINE

## 2017-07-11 PROCEDURE — 99214 OFFICE O/P EST MOD 30 MIN: CPT | Mod: S$PBB,,, | Performed by: INTERNAL MEDICINE

## 2017-07-11 NOTE — PROGRESS NOTES
Subjective:       Patient ID: Dakotah Braun is a 40 y.o. male.    Chief Complaint: Pain    HPI     39 yo male with PMH of CTS release on right in April 2016 here for evaluation.   He reports about 2 months ago, he started to have pain in both feet.  He also has pain in knees, hands, wrists,elbows, and shoulders.  Pain level is 3/10 and can get up to a 5/10. Pain is described is aching. He takes advil 3 in morning and 3 at night with improvement.  He has stiffness in both shoulders, worse on right side.  Before his carpal tunnel surgery, he had pain and tingling in right thumb with improvement after the surgery.  Denies rashes. Reports dry eyes and dry mouth in last 3 months.  He reports that his eye doctor told him he had dry eyes.   He has had swelling in both hands. He denies swelling else where.  Thinks like right knee may get swollen but not sure. Denies any warmth in joints.  Denies smoking or drinking.  Lost 12 pounds on purpose.  He reports that food feels different due to dry mouth.          family hx: negative for lupus, sjogrens      Interval history:  He is on third month of Humira. He is taking MTX 8 pills a week and folic acid once a day.  He is not taking any prednisone. Pain level is 0.5.  Denies morning stiffness.  He reports morning stiffness for 30 minutes, improved pain.   He has swelling in left hand and right third pip.   Using hands makes pain worse.        Review of Systems   Constitutional: Negative for fever, chills, appetite change and fatigue.   HENT: Negative for hearing loss, mouth sores, rhinorrhea, sinus pressure and trouble swallowing.    Eyes: Negative for photophobia, pain, discharge, itching and visual disturbance.   Respiratory: Negative for cough, chest tightness, wheezing and stridor.    Cardiovascular: Negative for chest pain and palpitations.   Gastrointestinal: Negative for blood in stool and abdominal distention.   Endocrine: Negative for cold intolerance and heat  intolerance.   Genitourinary: Negative for dysuria, hematuria and flank pain.   Musculoskeletal: Positive for joint swelling and arthralgias. Negative for myalgias, back pain, gait problem, neck pain and neck stiffness.   Skin: Negative for color change, pallor and rash.   Neurological: Negative for dizziness, light-headedness, numbness and headaches.   Hematological: Negative for adenopathy. Does not bruise/bleed easily.   Psychiatric/Behavioral: Negative for decreased concentration and agitation. The patient is not nervous/anxious.          Objective:        Physical Exam   Vitals reviewed.  Constitutional: No distress.   HENT:   Head: Normocephalic and atraumatic.   Right Ear: External ear normal.   Left Ear: External ear normal.   Mouth/Throat: No oropharyngeal exudate.   Enlarged lacrimal glans bilaterally   Eyes: Conjunctivae and EOM are normal. Pupils are equal, round, and reactive to light. Right eye exhibits no discharge. Left eye exhibits no discharge. No scleral icterus.   Neck: Normal range of motion. Neck supple. No JVD present. No tracheal deviation present. No thyromegaly present.   Cardiovascular: Normal rate and regular rhythm.  Exam reveals no gallop and no friction rub.    No murmur heard.  Pulmonary/Chest: Effort normal and breath sounds normal. No stridor. No respiratory distress. He has no wheezes. He has no rales.   Abdominal: Soft. Bowel sounds are normal. He exhibits no distension. There is no tenderness. There is no rebound.   Lymphadenopathy:     He has no cervical adenopathy.   Skin: Skin is warm and dry. He is not diaphoretic.     Musculoskeletal: Normal range of motion.   FROM of all joints including neck, shoulders, spine, ankles, wrists, knees, and ankles; no joint deformities noted or effusions, erythema or warmth; no tophi or nodules noted; no crepitus; no synovitis noted in hands or feet; no nail pitting or onycholysis     Right elbow with small nodule in bursa       Labs:  Deedee:  1:160  Ccp>1200  Rf>30  Ro,la-negative  Esr-80  crp-36  Hep B-negative  Hepatitis C-negative  hiv-negative     Arthritis survey (2016):  Findings: C-spine demonstrates no instability.  Right and left knees demonstrate no erosions.  Right left hands and wrists demonstrate no erosions.  Right and left feet demonstrate no erosions.      Assessment:       .       40 yo male with PMH of CTS release on right in April 2016 here for follow up of seropositive RA.    He is on MTX 8 pills of week and Humira and is doing well..  He has sicca symptoms but no antibodies for sjogrens. Suspect likely from high inflammation.  He stopped pilocarpine because he said BID dosing didn't work and stopped restasis because it made his eyes burn.    Plan:     Continue  Humira 40mg subq every 14 days   -continue MTX 8 pills a week  -continue folic acid 1mg po qday  -off  meloxicam 15 mg po day  - labs in august  rtc in 4 months  DISCUSSED TRYING REMIC[employer] VS [employer] in future if he does not feel like humira is working  *

## 2017-07-25 ENCOUNTER — PATIENT MESSAGE (OUTPATIENT)
Dept: RHEUMATOLOGY | Facility: CLINIC | Age: 42
End: 2017-07-25

## 2017-07-25 ENCOUNTER — PATIENT MESSAGE (OUTPATIENT)
Dept: INTERNAL MEDICINE | Facility: CLINIC | Age: 42
End: 2017-07-25

## 2017-07-27 NOTE — TELEPHONE ENCOUNTER
I don't know the proper way to deal with this. It appears to be a records request, so does it need to be routed to [facility]  (Release of Information)?    To my knowledge, I have completed and locked all his progress notes.    Please find out the correct way to deal with this request, route it appropriately, and then education me on the process.    Thanks!

## 2017-08-03 RX ORDER — METHOTREXATE 2.5 MG/1
TABLET ORAL
Qty: 32 TABLET | Refills: 4 | Status: SHIPPED | OUTPATIENT
Start: 2017-08-03 | End: 2017-11-03 | Stop reason: SDUPTHER

## 2017-08-09 ENCOUNTER — TELEPHONE (OUTPATIENT)
Dept: INTERNAL MEDICINE | Facility: CLINIC | Age: 42
End: 2017-08-09

## 2017-08-09 NOTE — TELEPHONE ENCOUNTER
Pt came to clinic and asked records to be faxed - see pt email documentation 07/25/17. Records fax#[phone] transmission confirmed ATTN Kimmie: 05/05/17 Dr. Pop office note, 07/11/17 & 05/09/17 Dr. Pastor office notes.

## 2017-08-10 ENCOUNTER — PATIENT MESSAGE (OUTPATIENT)
Dept: RHEUMATOLOGY | Facility: CLINIC | Age: 42
End: 2017-08-10

## 2017-08-15 ENCOUNTER — PATIENT MESSAGE (OUTPATIENT)
Dept: RHEUMATOLOGY | Facility: CLINIC | Age: 42
End: 2017-08-15

## 2017-08-15 ENCOUNTER — PATIENT MESSAGE (OUTPATIENT)
Dept: INTERNAL MEDICINE | Facility: CLINIC | Age: 42
End: 2017-08-15

## 2017-08-16 ENCOUNTER — TELEPHONE (OUTPATIENT)
Dept: INTERNAL MEDICINE | Facility: CLINIC | Age: 42
End: 2017-08-16

## 2017-08-16 NOTE — TELEPHONE ENCOUNTER
Faxed x-ray and labs results from 03/2017 to now ATTN Indigo, Fax transmission confirmed [phone]. [employer] message sent to pt informing him records sent.

## 2017-08-16 NOTE — TELEPHONE ENCOUNTER
----- Message from Rachel Tang MA sent at 8/16/2017  9:11 AM CDT -----      ----- Message -----  From: Indigo Parks  Sent: 8/16/2017   8:44 AM  To: Donn Cleveland Staff    Indigo with [employer] at [phone]//states medical records were sent to her but she is also needing the x-rays done  3-3-17 or any others//fax is [phone]//she spoke with Medical Release of Information and was told these records does not come from them//please call/brunilda/darya

## 2017-08-29 ENCOUNTER — LAB VISIT (OUTPATIENT)
Dept: LAB | Facility: HOSPITAL | Age: 42
End: 2017-08-29
Attending: INTERNAL MEDICINE
Payer: MEDICAID

## 2017-08-29 DIAGNOSIS — M06.9 RHEUMATOID ARTHRITIS INVOLVING MULTIPLE JOINTS: ICD-10-CM

## 2017-08-29 LAB
ALBUMIN SERPL BCP-MCNC: 3.5 G/DL
ALP SERPL-CCNC: 111 U/L
ALT SERPL W/O P-5'-P-CCNC: 19 U/L
ANION GAP SERPL CALC-SCNC: 10 MMOL/L
AST SERPL-CCNC: 12 U/L
BASOPHILS # BLD AUTO: 0.02 K/UL
BASOPHILS NFR BLD: 0.3 %
BILIRUB SERPL-MCNC: 0.5 MG/DL
BUN SERPL-MCNC: 15 MG/DL
CALCIUM SERPL-MCNC: 9.3 MG/DL
CHLORIDE SERPL-SCNC: 103 MMOL/L
CO2 SERPL-SCNC: 24 MMOL/L
CREAT SERPL-MCNC: 1.1 MG/DL
CRP SERPL-MCNC: 0.7 MG/L
DIFFERENTIAL METHOD: ABNORMAL
EOSINOPHIL # BLD AUTO: 0.3 K/UL
EOSINOPHIL NFR BLD: 5 %
ERYTHROCYTE [DISTWIDTH] IN BLOOD BY AUTOMATED COUNT: 13.4 %
ERYTHROCYTE [SEDIMENTATION RATE] IN BLOOD BY WESTERGREN METHOD: 2 MM/HR
EST. GFR  (AFRICAN AMERICAN): >60 ML/MIN/1.73 M^2
EST. GFR  (NON AFRICAN AMERICAN): >60 ML/MIN/1.73 M^2
GLUCOSE SERPL-MCNC: 91 MG/DL
HCT VFR BLD AUTO: 43.5 %
HGB BLD-MCNC: 14.9 G/DL
LYMPHOCYTES # BLD AUTO: 2.5 K/UL
LYMPHOCYTES NFR BLD: 37.7 %
MCH RBC QN AUTO: 31.3 PG
MCHC RBC AUTO-ENTMCNC: 34.3 G/DL
MCV RBC AUTO: 91 FL
MONOCYTES # BLD AUTO: 0.6 K/UL
MONOCYTES NFR BLD: 9 %
NEUTROPHILS # BLD AUTO: 3.1 K/UL
NEUTROPHILS NFR BLD: 47.8 %
PLATELET # BLD AUTO: 198 K/UL
PMV BLD AUTO: 9.3 FL
POTASSIUM SERPL-SCNC: 3.9 MMOL/L
PROT SERPL-MCNC: 8 G/DL
RBC # BLD AUTO: 4.76 M/UL
SODIUM SERPL-SCNC: 137 MMOL/L
WBC # BLD AUTO: 6.55 K/UL

## 2017-08-29 PROCEDURE — 85025 COMPLETE CBC W/AUTO DIFF WBC: CPT

## 2017-08-29 PROCEDURE — 86480 TB TEST CELL IMMUN MEASURE: CPT

## 2017-08-29 PROCEDURE — 85651 RBC SED RATE NONAUTOMATED: CPT | Mod: PO

## 2017-08-29 PROCEDURE — 86140 C-REACTIVE PROTEIN: CPT

## 2017-08-29 PROCEDURE — 36415 COLL VENOUS BLD VENIPUNCTURE: CPT | Mod: PO

## 2017-08-29 PROCEDURE — 80053 COMPREHEN METABOLIC PANEL: CPT

## 2017-08-30 ENCOUNTER — PATIENT MESSAGE (OUTPATIENT)
Dept: RHEUMATOLOGY | Facility: CLINIC | Age: 42
End: 2017-08-30

## 2017-08-31 LAB
MITOGEN NIL: >10 IU/ML
NIL: 0.06 IU/ML
TB ANTIGEN NIL: -0.01 IU/ML
TB ANTIGEN: 0.05 IU/ML
TB GOLD: NEGATIVE

## 2017-09-25 RX ORDER — ADALIMUMAB 40MG/0.8ML
KIT SUBCUTANEOUS
Qty: 2 EACH | Refills: 11 | Status: SHIPPED | OUTPATIENT
Start: 2017-09-25 | End: 2017-09-25 | Stop reason: SDUPTHER

## 2017-09-26 ENCOUNTER — TELEPHONE (OUTPATIENT)
Dept: PHARMACY | Facility: CLINIC | Age: 42
End: 2017-09-26

## 2017-09-26 ENCOUNTER — PATIENT MESSAGE (OUTPATIENT)
Dept: RHEUMATOLOGY | Facility: CLINIC | Age: 42
End: 2017-09-26

## 2017-09-26 NOTE — TELEPHONE ENCOUNTER
Informed patient that we recevied new prescription for Humira for patient and processed the prescription through his new insurance and it rejects for prior authorization. He said that he has gotten it from [facility] last month and wants to continue to use them, But that he is going out of town Thursday and needs a fill before he leaves Informed pharmacist.

## 2017-09-27 NOTE — TELEPHONE ENCOUNTER
Reach out to patient regarding specialty medication for Humira $3/004. He informed he is out of town at the moment and will need his refill by Thurs 09/28 he request to fill the medication with [facility] in [place], [place] and for the next month refill to reach out to us to have his Humira fill with [facility]. He voiced understanding.     [facility]:   [address] [place] [address] [place] [address]  Phone: [phone]

## 2017-11-03 ENCOUNTER — OFFICE VISIT (OUTPATIENT)
Dept: RHEUMATOLOGY | Facility: CLINIC | Age: 42
End: 2017-11-03
Payer: MEDICAID

## 2017-11-03 VITALS
HEIGHT: 70 IN | RESPIRATION RATE: 18 BRPM | BODY MASS INDEX: 29.32 KG/M2 | HEART RATE: 86 BPM | WEIGHT: 204.81 LBS | SYSTOLIC BLOOD PRESSURE: 131 MMHG | DIASTOLIC BLOOD PRESSURE: 81 MMHG

## 2017-11-03 DIAGNOSIS — Z79.631 ENCOUNTER FOR METHOTREXATE MONITORING: ICD-10-CM

## 2017-11-03 DIAGNOSIS — M06.9 RHEUMATOID ARTHRITIS INVOLVING MULTIPLE JOINTS: Primary | ICD-10-CM

## 2017-11-03 DIAGNOSIS — Z51.81 ENCOUNTER FOR METHOTREXATE MONITORING: ICD-10-CM

## 2017-11-03 PROCEDURE — 99213 OFFICE O/P EST LOW 20 MIN: CPT | Mod: PBBFAC | Performed by: INTERNAL MEDICINE

## 2017-11-03 PROCEDURE — 99999 PR PBB SHADOW E&M-EST. PATIENT-LVL III: CPT | Mod: PBBFAC,,, | Performed by: INTERNAL MEDICINE

## 2017-11-03 PROCEDURE — 99214 OFFICE O/P EST MOD 30 MIN: CPT | Mod: S$PBB,,, | Performed by: INTERNAL MEDICINE

## 2017-11-03 RX ORDER — OMEPRAZOLE 40 MG/1
40 CAPSULE, DELAYED RELEASE ORAL DAILY
Qty: 30 CAPSULE | Refills: 6 | Status: SHIPPED | OUTPATIENT
Start: 2017-11-03 | End: 2018-11-03

## 2017-11-03 RX ORDER — FOLIC ACID 1 MG/1
1000 TABLET ORAL DAILY
Qty: 30 TABLET | Refills: 3 | Status: SHIPPED | OUTPATIENT
Start: 2017-11-03 | End: 2018-04-16 | Stop reason: SDUPTHER

## 2017-11-03 RX ORDER — METHOTREXATE 2.5 MG/1
TABLET ORAL
Qty: 32 TABLET | Refills: 4 | Status: SHIPPED | OUTPATIENT
Start: 2017-11-03

## 2017-11-03 NOTE — PROGRESS NOTES
Subjective:       Patient ID: Dakotah Braun is a 40 y.o. male.    Chief Complaint: Pain    HPI     41 yo male with PMH of CTS release on right in April 2016 here for evaluation.   He reports about 2 months ago, he started to have pain in both feet.  He also has pain in knees, hands, wrists,elbows, and shoulders.  Pain level is 3/10 and can get up to a 5/10. Pain is described is aching. He takes advil 3 in morning and 3 at night with improvement.  He has stiffness in both shoulders, worse on right side.  Before his carpal tunnel surgery, he had pain and tingling in right thumb with improvement after the surgery.  Denies rashes. Reports dry eyes and dry mouth in last 3 months.  He reports that his eye doctor told him he had dry eyes.   He has had swelling in both hands. He denies swelling else where.  Thinks like right knee may get swollen but not sure. Denies any warmth in joints.  Denies smoking or drinking.  Lost 12 pounds on purpose.  He reports that food feels different due to dry mouth.          family hx: negative for lupus, sjogrens      Interval history:  He is on Humira. He is taking MTX 8 pills a week and folic acid once a day.  He is not taking any prednisone. Pain level is 0.5.  Denies morning stiffness.  He reports morning stiffness for 30 minutes, improved pain.   He has swelling in left hand and right third pip.   Using hands makes pain worse. He reports sinus congestion for 2 weeks.        Review of Systems   Constitutional: Negative for fever, chills, appetite change and fatigue.   HENT: Negative for hearing loss, mouth sores, rhinorrhea, sinus pressure and trouble swallowing.    Eyes: Negative for photophobia, pain, discharge, itching and visual disturbance.   Respiratory: Negative for cough, chest tightness, wheezing and stridor.    Cardiovascular: Negative for chest pain and palpitations.   Gastrointestinal: Negative for blood in stool and abdominal distention.   Endocrine: Negative for cold  intolerance and heat intolerance.   Genitourinary: Negative for dysuria, hematuria and flank pain.   Musculoskeletal: Positive for joint swelling and arthralgias. Negative for myalgias, back pain, gait problem, neck pain and neck stiffness.   Skin: Negative for color change, pallor and rash.   Neurological: Negative for dizziness, light-headedness, numbness and headaches.   Hematological: Negative for adenopathy. Does not bruise/bleed easily.   Psychiatric/Behavioral: Negative for decreased concentration and agitation. The patient is not nervous/anxious.          Objective:        Physical Exam   Vitals reviewed.  Constitutional: No distress.   HENT:   Head: Normocephalic and atraumatic.   Right Ear: External ear normal.   Left Ear: External ear normal.   Mouth/Throat: No oropharyngeal exudate.   Enlarged lacrimal glans bilaterally   Eyes: Conjunctivae and EOM are normal. Pupils are equal, round, and reactive to light. Right eye exhibits no discharge. Left eye exhibits no discharge. No scleral icterus.   Neck: Normal range of motion. Neck supple. No JVD present. No tracheal deviation present. No thyromegaly present.   Cardiovascular: Normal rate and regular rhythm.  Exam reveals no gallop and no friction rub.    No murmur heard.  Pulmonary/Chest: Effort normal and breath sounds normal. No stridor. No respiratory distress. He has no wheezes. He has no rales.   Abdominal: Soft. Bowel sounds are normal. He exhibits no distension. There is no tenderness. There is no rebound.   Lymphadenopathy:     He has no cervical adenopathy.   Skin: Skin is warm and dry. He is not diaphoretic.     Musculoskeletal: Normal range of motion.   FROM of all joints including neck, shoulders, spine, ankles, wrists, knees, and ankles; no joint deformities noted or effusions, erythema or warmth; no tophi or nodules noted; no crepitus; no synovitis noted in hands or feet; no nail pitting or onycholysis     Right elbow with small nodule in bursa        Labs:  Deedee: 1:160  Ccp>1200  Rf>30  Ro,la-negative  Esr-80  crp-36  Hep B-negative  Hepatitis C-negative  hiv-negative     Arthritis survey (2016):  Findings: C-spine demonstrates no instability.  Right and left knees demonstrate no erosions.  Right left hands and wrists demonstrate no erosions.  Right and left feet demonstrate no erosions.      Assessment:       .       40 yo male with PMH of CTS release on right in April 2016 here for follow up of seropositive RA.    He is on MTX 8 pills of week and Humira and is doing well..  He has sicca symptoms but no antibodies for sjogrens. Suspect likely from high inflammation.  He stopped pilocarpine because he said BID dosing didn't work and stopped restasis because it made his eyes burn.    He reports sinus congestion but no fevers and has no sinus tenderness on exam. Suspect allergic rhinitis.      Plan:     Continue  Humira 40mg subq every 14 days   -continue MTX 8 pills a week  -continue folic acid 1mg po qday  -off  meloxicam 15 mg po day  - labs in a month  rtc in 4 months  Start claritin 10mg po qday  Start otc flonase BID  DISCUSSED TRYING REMIC[facility] VS [employer] INFUSIONS in future if he does not feel like humira is working  *

## 2017-11-24 ENCOUNTER — TELEPHONE (OUTPATIENT)
Dept: PHARMACY | Facility: CLINIC | Age: 42
End: 2017-11-24

## 2017-12-06 ENCOUNTER — LAB VISIT (OUTPATIENT)
Dept: LAB | Facility: HOSPITAL | Age: 42
End: 2017-12-06
Attending: INTERNAL MEDICINE
Payer: MEDICAID

## 2017-12-06 DIAGNOSIS — M06.9 RHEUMATOID ARTHRITIS INVOLVING MULTIPLE JOINTS: ICD-10-CM

## 2017-12-06 LAB
ALBUMIN SERPL BCP-MCNC: 3.6 G/DL
ALP SERPL-CCNC: 69 U/L
ALT SERPL W/O P-5'-P-CCNC: 12 U/L
ANION GAP SERPL CALC-SCNC: 5 MMOL/L
AST SERPL-CCNC: 11 U/L
BASOPHILS # BLD AUTO: 0.04 K/UL
BASOPHILS NFR BLD: 0.8 %
BILIRUB SERPL-MCNC: 0.9 MG/DL
BUN SERPL-MCNC: 15 MG/DL
CALCIUM SERPL-MCNC: 9.4 MG/DL
CHLORIDE SERPL-SCNC: 104 MMOL/L
CO2 SERPL-SCNC: 28 MMOL/L
CREAT SERPL-MCNC: 1.2 MG/DL
CRP SERPL-MCNC: 0.3 MG/L
DIFFERENTIAL METHOD: ABNORMAL
EOSINOPHIL # BLD AUTO: 0.4 K/UL
EOSINOPHIL NFR BLD: 8.6 %
ERYTHROCYTE [DISTWIDTH] IN BLOOD BY AUTOMATED COUNT: 12.5 %
ERYTHROCYTE [SEDIMENTATION RATE] IN BLOOD BY WESTERGREN METHOD: 1 MM/HR
EST. GFR  (AFRICAN AMERICAN): >60 ML/MIN/1.73 M^2
EST. GFR  (NON AFRICAN AMERICAN): >60 ML/MIN/1.73 M^2
GLUCOSE SERPL-MCNC: 95 MG/DL
HCT VFR BLD AUTO: 43 %
HGB BLD-MCNC: 14.5 G/DL
IMM GRANULOCYTES # BLD AUTO: 0 K/UL
IMM GRANULOCYTES NFR BLD AUTO: 0 %
LYMPHOCYTES # BLD AUTO: 2.1 K/UL
LYMPHOCYTES NFR BLD: 42.4 %
MCH RBC QN AUTO: 31 PG
MCHC RBC AUTO-ENTMCNC: 33.7 G/DL
MCV RBC AUTO: 92 FL
MONOCYTES # BLD AUTO: 0.6 K/UL
MONOCYTES NFR BLD: 12.8 %
NEUTROPHILS # BLD AUTO: 1.7 K/UL
NEUTROPHILS NFR BLD: 35.4 %
NRBC BLD-RTO: 0 /100 WBC
PLATELET # BLD AUTO: 198 K/UL
PMV BLD AUTO: 9 FL
POTASSIUM SERPL-SCNC: 4.4 MMOL/L
PROT SERPL-MCNC: 7.6 G/DL
RBC # BLD AUTO: 4.68 M/UL
SODIUM SERPL-SCNC: 137 MMOL/L
WBC # BLD AUTO: 4.86 K/UL

## 2017-12-06 PROCEDURE — 85651 RBC SED RATE NONAUTOMATED: CPT | Mod: PO

## 2017-12-06 PROCEDURE — 85025 COMPLETE CBC W/AUTO DIFF WBC: CPT

## 2017-12-06 PROCEDURE — 80053 COMPREHEN METABOLIC PANEL: CPT

## 2017-12-06 PROCEDURE — 86140 C-REACTIVE PROTEIN: CPT

## 2017-12-06 PROCEDURE — 36415 COLL VENOUS BLD VENIPUNCTURE: CPT | Mod: PO

## 2017-12-07 ENCOUNTER — PATIENT MESSAGE (OUTPATIENT)
Dept: RHEUMATOLOGY | Facility: CLINIC | Age: 42
End: 2017-12-07

## 2017-12-08 ENCOUNTER — PATIENT MESSAGE (OUTPATIENT)
Dept: RHEUMATOLOGY | Facility: CLINIC | Age: 42
End: 2017-12-08

## 2017-12-21 ENCOUNTER — TELEPHONE (OUTPATIENT)
Dept: PHARMACY | Facility: CLINIC | Age: 42
End: 2017-12-21

## 2017-12-22 ENCOUNTER — TELEPHONE (OUTPATIENT)
Dept: PHARMACY | Facility: CLINIC | Age: 42
End: 2017-12-22

## 2017-12-26 ENCOUNTER — TELEPHONE (OUTPATIENT)
Dept: PHARMACY | Facility: CLINIC | Age: 42
End: 2017-12-26

## 2017-12-29 NOTE — TELEPHONE ENCOUNTER
Dr Pastor and Staff,    [facility] has been attempting to reach Mr. Braun regarding prescription for Humira. After numerous unsuccessful attempts, we are sending a postcard to the address on file. At this point in time, no further contact will be made from [facility] until patient responds to our mail correspondence.    If there is anything else we can do to further assist, please let us know.     Thanks,  Thao Martinez, PharmD  [facility]- Clinical Pharmacist  [phone]

## 2018-01-31 ENCOUNTER — PATIENT MESSAGE (OUTPATIENT)
Dept: RHEUMATOLOGY | Facility: CLINIC | Age: 43
End: 2018-01-31

## 2018-04-16 RX ORDER — FOLIC ACID 1 MG/1
TABLET ORAL
Qty: 30 TABLET | Refills: 3 | Status: SHIPPED | OUTPATIENT
Start: 2018-04-16

## 2018-05-14 DIAGNOSIS — M19.90 INFLAMMATORY ARTHRITIS: Primary | ICD-10-CM

## 2018-05-14 RX ORDER — METHOTREXATE 2.5 MG/1
TABLET ORAL
Qty: 32 TABLET | Refills: 4 | OUTPATIENT
Start: 2018-05-14

## 2018-05-21 ENCOUNTER — PATIENT MESSAGE (OUTPATIENT)
Dept: RHEUMATOLOGY | Facility: CLINIC | Age: 43
End: 2018-05-21

## 2018-05-21 DIAGNOSIS — M06.9 RHEUMATOID ARTHRITIS INVOLVING MULTIPLE JOINTS: Primary | ICD-10-CM

## 2018-05-21 RX ORDER — METHOTREXATE 2.5 MG/1
TABLET ORAL
Qty: 32 TABLET | Refills: 4 | OUTPATIENT
Start: 2018-05-21

## 2018-07-03 ENCOUNTER — TELEPHONE (OUTPATIENT)
Dept: RHEUMATOLOGY | Facility: CLINIC | Age: 43
End: 2018-07-03

## 2018-07-03 DIAGNOSIS — M06.9 RHEUMATOID ARTHRITIS INVOLVING MULTIPLE JOINTS: Primary | ICD-10-CM

## 2020-04-08 ENCOUNTER — TELEPHONE (OUTPATIENT)
Dept: ADMINISTRATIVE | Facility: HOSPITAL | Age: 45
End: 2020-04-08

## 2022-12-18 ENCOUNTER — OFFICE VISIT (OUTPATIENT)
Dept: URGENT CARE | Facility: CLINIC | Age: 47
End: 2022-12-18

## 2022-12-18 VITALS
DIASTOLIC BLOOD PRESSURE: 84 MMHG | BODY MASS INDEX: 33.36 KG/M2 | TEMPERATURE: 98 F | WEIGHT: 233 LBS | HEART RATE: 62 BPM | OXYGEN SATURATION: 98 % | RESPIRATION RATE: 20 BRPM | SYSTOLIC BLOOD PRESSURE: 128 MMHG | HEIGHT: 70 IN

## 2022-12-18 DIAGNOSIS — J32.9 RHINOSINUSITIS: Primary | ICD-10-CM

## 2022-12-18 PROCEDURE — 99214 PR OFFICE/OUTPT VISIT, EST, LEVL IV, 30-39 MIN: ICD-10-PCS | Mod: TIER,S$GLB,, | Performed by: NURSE PRACTITIONER

## 2022-12-18 PROCEDURE — 99214 OFFICE O/P EST MOD 30 MIN: CPT | Mod: TIER,S$GLB,, | Performed by: NURSE PRACTITIONER

## 2022-12-18 RX ORDER — BROMPHENIRAMINE MALEATE, PSEUDOEPHEDRINE HYDROCHLORIDE, AND DEXTROMETHORPHAN HYDROBROMIDE 2; 30; 10 MG/5ML; MG/5ML; MG/5ML
10 SYRUP ORAL EVERY 4 HOURS PRN
Qty: 118 ML | Refills: 0 | Status: SHIPPED | OUTPATIENT
Start: 2022-12-18 | End: 2022-12-28

## 2022-12-18 RX ORDER — AZELASTINE 1 MG/ML
1 SPRAY, METERED NASAL 2 TIMES DAILY
Qty: 30 ML | Refills: 0 | Status: SHIPPED | OUTPATIENT
Start: 2022-12-18 | End: 2023-12-18

## 2022-12-18 NOTE — PROGRESS NOTES
"Subjective:       Patient ID: Dakotah Braun is a 47 y.o. male.    Vitals:  height is 5' 10" (1.778 m) and weight is 105.7 kg (233 lb). His oral temperature is 97.5 °F (36.4 °C). His blood pressure is 128/84 and his pulse is 62. His respiration is 20 and oxygen saturation is 98%.     Chief Complaint: Sinus Problem    47-year-old male seen today for nasal drainage and pressure.  States approximately 3 days ago he fell asleep with a fan on and woke up the next morning with a runny nose and sinus congestion.  States he has been taking over-the-counter Anila-Fleming plus with minimal improvement.  Has not had fever, cough, or other symptoms.    Sinus Problem  This is a new problem. The current episode started in the past 7 days (3 days ago). Associated symptoms include congestion, headaches and sinus pressure. Pertinent negatives include no chills, coughing, ear pain or sore throat.     Constitution: Negative for chills and fever.   HENT:  Positive for congestion and sinus pressure. Negative for ear pain and sore throat.    Neck: Negative for painful lymph nodes.   Cardiovascular:  Negative for chest pain and palpitations.   Respiratory:  Negative for cough.    Gastrointestinal:  Negative for abdominal pain.   Skin:  Negative for rash.   Neurological:  Positive for headaches. Negative for dizziness, light-headedness, passing out, disorientation and altered mental status.   Hematologic/Lymphatic: Negative for swollen lymph nodes.   Psychiatric/Behavioral:  Negative for altered mental status, disorientation and confusion.      Objective:      Physical Exam   Constitutional: He is oriented to person, place, and time. He appears well-developed. He is cooperative.  Non-toxic appearance. He does not appear ill. No distress.   HENT:   Head: Normocephalic and atraumatic.   Ears:   Right Ear: Hearing, tympanic membrane, external ear and ear canal normal.   Left Ear: Hearing, tympanic membrane, external ear and ear canal normal. "   Nose: Mucosal edema, rhinorrhea and congestion present. No nasal deformity. No epistaxis. Right sinus exhibits no maxillary sinus tenderness and no frontal sinus tenderness. Left sinus exhibits no maxillary sinus tenderness and no frontal sinus tenderness.   Mouth/Throat: Uvula is midline and mucous membranes are normal. Mucous membranes are moist. No trismus in the jaw. Normal dentition. No uvula swelling. No oropharyngeal exudate, posterior oropharyngeal edema, posterior oropharyngeal erythema, tonsillar abscesses or cobblestoning. Tonsils are 0 on the right. Tonsils are 0 on the left. Oropharynx is clear.   Eyes: Conjunctivae and lids are normal. No scleral icterus.   Neck: Trachea normal and phonation normal. Neck supple. No edema present. No erythema present. No neck rigidity present.   Cardiovascular: Normal rate, regular rhythm, normal heart sounds and normal pulses.   Pulmonary/Chest: Effort normal and breath sounds normal. No respiratory distress. He has no decreased breath sounds. He has no rhonchi.   Abdominal: Normal appearance.   Musculoskeletal: Normal range of motion.         General: No deformity. Normal range of motion.   Lymphadenopathy:     He has no cervical adenopathy.   Neurological: He is alert and oriented to person, place, and time. He exhibits normal muscle tone. Coordination normal.   Skin: Skin is warm, dry, intact, not diaphoretic and not pale. Capillary refill takes 2 to 3 seconds.   Psychiatric: His speech is normal and behavior is normal. Judgment and thought content normal.   Nursing note and vitals reviewed.      Assessment:       1. Rhinosinusitis          Plan:         Rhinosinusitis  -     azelastine (ASTELIN) 137 mcg (0.1 %) nasal spray; 1 spray (137 mcg total) by Nasal route 2 (two) times daily.  Dispense: 30 mL; Refill: 0  -     brompheniramine-pseudoeph-DM (BROMFED DM) 2-30-10 mg/5 mL Syrp; Take 10 mLs by mouth every 4 (four) hours as needed (cough).  Dispense: 118 mL;  Refill: 0         I have discussed the physical exam findings with the patient.  I do not suspect influenza or COVID-19 infection.  We discussed symptom monitoring, conservative care methods, medication use, and follow up orders. He verbalized understanding and agreement with the plan of care.         Increase clear fluid intake  Stop all current over the counter cough, cold, flu medicine  Tylenol/motrin otc for fever or pain  Hold Flonase nasal spray, and start azelastine nasal spray and Bromfed syrup for sinus congestion and pressure.     Saltwater gargles 4 x daily and OTC anesthetic throat lozenges for sore throat.    Follow up with PCP  Go immediately to the nearest emergency room for shortness of breath, chest pain,  or other emergent concern.  Return to clinic for new, worse, or unresolving symptoms

## 2022-12-18 NOTE — PATIENT INSTRUCTIONS
Increase clear fluid intake  Stop all current over the counter cough, cold, flu medicine  Tylenol/motrin otc for fever or pain  Hold Flonase nasal spray, and start azelastine nasal spray and Bromfed syrup for sinus congestion and pressure.     Saltwater gargles 4 x daily and OTC anesthetic throat lozenges for sore throat.    Follow up with PCP  Go immediately to the nearest emergency room for shortness of breath, chest pain,  or other emergent concern.  Return to clinic for new, worse, or unresolving symptoms

## 2023-05-13 ENCOUNTER — OFFICE VISIT (OUTPATIENT)
Dept: URGENT CARE | Facility: CLINIC | Age: 48
End: 2023-05-13

## 2023-05-13 VITALS
BODY MASS INDEX: 33.36 KG/M2 | TEMPERATURE: 98 F | WEIGHT: 233 LBS | OXYGEN SATURATION: 99 % | HEART RATE: 61 BPM | SYSTOLIC BLOOD PRESSURE: 141 MMHG | RESPIRATION RATE: 16 BRPM | HEIGHT: 70 IN | DIASTOLIC BLOOD PRESSURE: 91 MMHG

## 2023-05-13 DIAGNOSIS — H00.014 HORDEOLUM EXTERNUM LEFT UPPER EYELID: Primary | ICD-10-CM

## 2023-05-13 PROCEDURE — 99214 PR OFFICE/OUTPT VISIT, EST, LEVL IV, 30-39 MIN: ICD-10-PCS | Mod: TIER,S$GLB,, | Performed by: NURSE PRACTITIONER

## 2023-05-13 PROCEDURE — 99214 OFFICE O/P EST MOD 30 MIN: CPT | Mod: TIER,S$GLB,, | Performed by: NURSE PRACTITIONER

## 2023-05-13 RX ORDER — ERYTHROMYCIN 5 MG/G
OINTMENT OPHTHALMIC 4 TIMES DAILY
Qty: 3.5 G | Refills: 0 | Status: SHIPPED | OUTPATIENT
Start: 2023-05-13 | End: 2023-05-20

## 2023-05-13 NOTE — PROGRESS NOTES
"Subjective:      Patient ID: Dakotah Braun is a 47 y.o. male.    Vitals:  height is 5' 10" (1.778 m) and weight is 105.7 kg (233 lb). His temperature is 98 °F (36.7 °C). His blood pressure is 141/91 (abnormal) and his pulse is 61. His respiration is 16 and oxygen saturation is 99%.     Chief Complaint: Stye    Pt c/o bump on left eye X 4 days. Pain 3/10. Denies visual changes. Denies trying warm compresses.     Constitution: Negative for chills and fever.   Cardiovascular:  Negative for chest pain, palpitations and sob on exertion.   Eyes:  Positive for eye itching and eyelid swelling. Negative for eye trauma, eye discharge, eye pain and blurred vision.   Respiratory:  Negative for shortness of breath.    Neurological:  Negative for dizziness, light-headedness, passing out, disorientation and altered mental status.   Psychiatric/Behavioral:  Negative for altered mental status, disorientation and confusion.     Objective:     Physical Exam   Constitutional: He is oriented to person, place, and time. He appears well-developed. He is cooperative.  Non-toxic appearance. He does not appear ill. No distress.   HENT:   Head: Normocephalic and atraumatic.   Ears:   Right Ear: External ear normal.   Left Ear: External ear normal.   Nose: Nose normal.   Mouth/Throat: Oropharynx is clear and moist and mucous membranes are normal. Mucous membranes are moist. Oropharynx is clear.   Eyes: Conjunctivae and lids are normal. Pupils are equal, round, and reactive to light. Left eye exhibits hordeolum. Left eye exhibits no chemosis and no exudate. Left conjunctiva is not injected. Left conjunctiva has no hemorrhage. No scleral icterus.     Extraocular movement intact vision grossly intact gaze aligned appropriately periorbital hyperpigmentation  Neck: Trachea normal and phonation normal. Neck supple.   Cardiovascular: Normal rate, regular rhythm, normal heart sounds and normal pulses.   Pulmonary/Chest: Effort normal and breath " sounds normal. No stridor. No respiratory distress.   Abdominal: Normal appearance.   Musculoskeletal:         General: No deformity.   Neurological: no focal deficit. He is alert and oriented to person, place, and time. He has normal strength and normal reflexes. No sensory deficit.   Skin: Skin is warm, dry, intact and not diaphoretic. Capillary refill takes 2 to 3 seconds.   Psychiatric: His speech is normal and behavior is normal. Judgment and thought content normal.   Nursing note and vitals reviewed.    Assessment:     1. Hordeolum externum left upper eyelid        Plan:       Hordeolum externum left upper eyelid  -     erythromycin (ROMYCIN) ophthalmic ointment; Place into the left eye 4 (four) times daily. for 7 days  Dispense: 3.5 g; Refill: 0

## 2024-02-06 ENCOUNTER — HOSPITAL ENCOUNTER (EMERGENCY)
Facility: HOSPITAL | Age: 49
Discharge: HOME | End: 2024-02-06
Attending: EMERGENCY MEDICINE
Payer: COMMERCIAL

## 2024-02-06 VITALS
WEIGHT: 225 LBS | RESPIRATION RATE: 18 BRPM | HEART RATE: 57 BPM | OXYGEN SATURATION: 95 % | SYSTOLIC BLOOD PRESSURE: 116 MMHG | TEMPERATURE: 97 F | BODY MASS INDEX: 32.21 KG/M2 | HEIGHT: 70 IN | DIASTOLIC BLOOD PRESSURE: 78 MMHG

## 2024-02-06 DIAGNOSIS — R55 VASOVAGAL SYNCOPE: Primary | ICD-10-CM

## 2024-02-06 DIAGNOSIS — R55 SYNCOPE: ICD-10-CM

## 2024-02-06 LAB
ALBUMIN SERPL BCP-MCNC: 4 G/DL (ref 3.5–5.2)
ALP SERPL-CCNC: 81 U/L (ref 55–135)
ALT SERPL W/O P-5'-P-CCNC: 26 U/L (ref 10–44)
ANION GAP SERPL CALC-SCNC: 12 MMOL/L (ref 8–16)
AST SERPL-CCNC: 18 U/L (ref 10–40)
BASOPHILS # BLD AUTO: 0.02 K/UL (ref 0–0.2)
BASOPHILS NFR BLD: 0.3 % (ref 0–1.9)
BILIRUB SERPL-MCNC: 0.6 MG/DL (ref 0.1–1)
BUN SERPL-MCNC: 15 MG/DL (ref 6–20)
CALCIUM SERPL-MCNC: 9.4 MG/DL (ref 8.7–10.5)
CHLORIDE SERPL-SCNC: 104 MMOL/L (ref 95–110)
CO2 SERPL-SCNC: 24 MMOL/L (ref 23–29)
CREAT SERPL-MCNC: 1.1 MG/DL (ref 0.5–1.4)
DIFFERENTIAL METHOD BLD: ABNORMAL
EOSINOPHIL # BLD AUTO: 0.1 K/UL (ref 0–0.5)
EOSINOPHIL NFR BLD: 0.8 % (ref 0–8)
ERYTHROCYTE [DISTWIDTH] IN BLOOD BY AUTOMATED COUNT: 12.5 % (ref 11.5–14.5)
EST. GFR  (NO RACE VARIABLE): >60 ML/MIN/1.73 M^2
GLUCOSE SERPL-MCNC: 87 MG/DL (ref 70–110)
HCT VFR BLD AUTO: 45.2 % (ref 40–54)
HGB BLD-MCNC: 15.5 G/DL (ref 14–18)
IMM GRANULOCYTES # BLD AUTO: 0.02 K/UL (ref 0–0.04)
IMM GRANULOCYTES NFR BLD AUTO: 0.3 % (ref 0–0.5)
LYMPHOCYTES # BLD AUTO: 0.8 K/UL (ref 1–4.8)
LYMPHOCYTES NFR BLD: 10 % (ref 18–48)
MCH RBC QN AUTO: 30.2 PG (ref 27–31)
MCHC RBC AUTO-ENTMCNC: 34.3 G/DL (ref 32–36)
MCV RBC AUTO: 88 FL (ref 82–98)
MONOCYTES # BLD AUTO: 0.6 K/UL (ref 0.3–1)
MONOCYTES NFR BLD: 7.6 % (ref 4–15)
NEUTROPHILS # BLD AUTO: 6.1 K/UL (ref 1.8–7.7)
NEUTROPHILS NFR BLD: 81 % (ref 38–73)
NRBC BLD-RTO: 0 /100 WBC
PLATELET # BLD AUTO: 198 K/UL (ref 150–450)
PMV BLD AUTO: 8.7 FL (ref 9.2–12.9)
POTASSIUM SERPL-SCNC: 4 MMOL/L (ref 3.5–5.1)
PROT SERPL-MCNC: 8.5 G/DL (ref 6–8.4)
RBC # BLD AUTO: 5.14 M/UL (ref 4.6–6.2)
SODIUM SERPL-SCNC: 140 MMOL/L (ref 136–145)
TROPONIN I SERPL DL<=0.01 NG/ML-MCNC: <0.006 NG/ML (ref 0–0.03)
WBC # BLD AUTO: 7.51 K/UL (ref 3.9–12.7)

## 2024-02-06 PROCEDURE — 87389 HIV-1 AG W/HIV-1&-2 AB AG IA: CPT | Performed by: EMERGENCY MEDICINE

## 2024-02-06 PROCEDURE — 93010 ELECTROCARDIOGRAM REPORT: CPT | Mod: ,,, | Performed by: INTERNAL MEDICINE

## 2024-02-06 PROCEDURE — 93005 ELECTROCARDIOGRAM TRACING: CPT

## 2024-02-06 PROCEDURE — 71045 X-RAY EXAM CHEST 1 VIEW: CPT | Mod: TC

## 2024-02-06 PROCEDURE — 85025 COMPLETE CBC W/AUTO DIFF WBC: CPT | Performed by: EMERGENCY MEDICINE

## 2024-02-06 PROCEDURE — 99285 EMERGENCY DEPT VISIT HI MDM: CPT | Mod: 25

## 2024-02-06 PROCEDURE — 84484 ASSAY OF TROPONIN QUANT: CPT | Performed by: EMERGENCY MEDICINE

## 2024-02-06 PROCEDURE — 71045 X-RAY EXAM CHEST 1 VIEW: CPT | Mod: 26,,, | Performed by: RADIOLOGY

## 2024-02-06 PROCEDURE — 80053 COMPREHEN METABOLIC PANEL: CPT | Performed by: EMERGENCY MEDICINE

## 2024-02-06 PROCEDURE — 25000003 PHARM REV CODE 250: Performed by: EMERGENCY MEDICINE

## 2024-02-06 PROCEDURE — 86803 HEPATITIS C AB TEST: CPT | Performed by: EMERGENCY MEDICINE

## 2024-02-06 PROCEDURE — 96360 HYDRATION IV INFUSION INIT: CPT

## 2024-02-06 RX ORDER — SODIUM CHLORIDE 9 MG/ML
1000 INJECTION, SOLUTION INTRAVENOUS
Status: DISCONTINUED | OUTPATIENT
Start: 2024-02-06 | End: 2024-02-06

## 2024-02-06 RX ADMIN — SODIUM CHLORIDE 1000 ML: 9 INJECTION, SOLUTION INTRAVENOUS at 08:02

## 2024-02-07 LAB
HCV AB SERPL QL IA: NORMAL
HIV 1+2 AB+HIV1 P24 AG SERPL QL IA: NORMAL
OHS QRS DURATION: 96 MS
OHS QTC CALCULATION: 407 MS

## 2024-02-07 NOTE — ED NOTES
"Pt reports starting a new diet a month ago and had his first meal today at dinner. Pt reports sitting down and having a syncopal episode while at the table. Pt reports mother reported patient was only out for "a few seconds". Pt denies pain or feeling dizzy at this time.   "

## 2024-02-07 NOTE — ED PROVIDER NOTES
Encounter Date: 2/6/2024       History     Chief Complaint   Patient presents with    Loss of Consciousness     Pt. C/o syncopal episode. States he was sitting at the table and then woke up on the floor.      Pt here after syncope while eating dinner pta. He says he got lightheaded then woke up on the floor. He reports that he is dieting and not eating much as he is trying to lose 20lbs. No abd pain. No NVD. No assoc CP/SOB. He feels well now. Ambulated to room without sxs.     The history is provided by the patient.     Review of patient's allergies indicates:   Allergen Reactions    [place]      Raw Almonds    Cherry [cherries]      Black Cherries     Past Medical History:   Diagnosis Date    GERD (gastroesophageal reflux disease)     Rheumatoid arthritis     Seasonal allergies     Sicca syndrome with keratoconjunctivitis      Past Surgical History:   Procedure Laterality Date    CARPAL TUNNEL RELEASE Right     MULTIPLE STERIOD INJECTIONS       Family History   Problem Relation Age of Onset    Diabetes type II Mother     Asthma Mother     Arthritis Father     Cataracts Father      Social History     Tobacco Use    Smoking status: Never    Smokeless tobacco: Never   Substance Use Topics    Alcohol use: No     Alcohol/week: 0.0 standard drinks of alcohol     Comment: 2-4 times per month.    Drug use: No     Review of Systems   Neurological:  Positive for dizziness, syncope and light-headedness.   All other systems reviewed and are negative.      Physical Exam     Initial Vitals [02/06/24 1807]   BP Pulse Resp Temp SpO2   126/87 61 17 97.4 °F (36.3 °C) 99 %      MAP       --         Physical Exam    Nursing note and vitals reviewed.  Constitutional: He appears well-developed and well-nourished. He is not diaphoretic. No distress.   HENT:   Head: Normocephalic and atraumatic.   Mouth/Throat: Oropharynx is clear and moist.   Eyes: Conjunctivae and EOM are normal. Pupils are equal, round, and reactive to light. No  scleral icterus.   Neck: Neck supple.   Normal range of motion.  Cardiovascular:  Normal rate, regular rhythm, normal heart sounds and intact distal pulses.           Pulmonary/Chest: Breath sounds normal.   Abdominal: Abdomen is soft. There is no abdominal tenderness.   Musculoskeletal:         General: No tenderness or edema. Normal range of motion.      Cervical back: Normal range of motion and neck supple.     Neurological: He is alert and oriented to person, place, and time. He has normal strength. No cranial nerve deficit or sensory deficit. GCS score is 15. GCS eye subscore is 4. GCS verbal subscore is 5. GCS motor subscore is 6.   Skin: Skin is warm and dry. Capillary refill takes less than 2 seconds. No rash noted. No erythema. No pallor.   Psychiatric: He has a normal mood and affect.         ED Course   Procedures  Labs Reviewed   CBC W/ AUTO DIFFERENTIAL - Abnormal; Notable for the following components:       Result Value    MPV 8.7 (*)     Lymph # 0.8 (*)     Gran % 81.0 (*)     Lymph % 10.0 (*)     All other components within normal limits   COMPREHENSIVE METABOLIC PANEL - Abnormal; Notable for the following components:    Total Protein 8.5 (*)     All other components within normal limits   HIV 1 / 2 ANTIBODY   HEPATITIS C ANTIBODY   TROPONIN I     EKG Readings: (Independently Interpreted)   Rhythm: Sinus Bradycardia. Heart Rate: 55. Ectopy: No Ectopy. Conduction: Normal. ST Segments: Normal ST Segments. T Waves: Normal. Axis: Normal. Clinical Impression: Sinus Bradycardia       Imaging Results              X-Ray Chest AP Portable (Final result)  Result time 02/06/24 20:46:44      Final result by Cyrus Friend MD (02/06/24 20:46:44)                   Impression:      No convincing radiographic evidence of acute intrathoracic process on this single view.      Electronically signed by: Cyrus Friend MD  Date:    02/06/2024  Time:    20:46               Narrative:    EXAMINATION:  XR CHEST AP  PORTABLE    CLINICAL HISTORY:  syncope;    TECHNIQUE:  Single frontal view of the chest was performed.    COMPARISON:  03/03/2017    FINDINGS:  Cardiac monitoring leads overlie the chest.  Cardiac silhouette appears within normal limits.  Lungs are symmetrically expanded without evidence of confluent airspace consolidation.  No significant volume of pleural fluid or pneumothorax identified.  The visualized osseous structures appear intact.                                       Medications   sodium chloride 0.9% bolus 1,000 mL 1,000 mL (1,000 mLs Intravenous New Bag 2/6/24 2016)     Medical Decision Making  Pt presented after syncopal event while sitting at home pta. Suspect vagal event. Being exam here. CBC, CMP and troponin unremarkable. Neg EKG and CXR. He was given 1L NS for suspected volume depletion. He ambulated without difficulty to RR.   At this time, I feel there is no emergent condition requiring further evaluation or admission. I believe the patient is stable for discharge from the ED. The patient and any additional family present were updated with test results, overall clinical impression, and recommended further plan of care. All questions were answered. patient/caregiver expressed understanding and agreed with current plan for discharge with PCP follow-up within 1 week. Strict return precautions were provided, including fever, N/V, chest pain or return/worsening of current symptoms or any other concerns.     Amount and/or Complexity of Data Reviewed  Labs: ordered. Decision-making details documented in ED Course.  Radiology: ordered. Decision-making details documented in ED Course.  ECG/medicine tests: ordered and independent interpretation performed. Decision-making details documented in ED Course.               ED Course as of 02/06/24 2103 Tue Feb 06, 2024 2059 Orthostatics unremarkable.  [DC]      ED Course User Index  [DC] Farzad Hawley Jr., MD                           Clinical  Impression:  Final diagnoses:  [R55] Syncope  [R55] Vasovagal syncope (Primary)          ED Disposition Condition    Discharge Stable          ED Prescriptions    None       Follow-up Information       Follow up With Specialties Details Why Contact Info    primary care clinic  Schedule an appointment as soon as possible for a visit                Farzad Hawley Jr., MD  02/06/24 9259

## 2024-03-26 ENCOUNTER — OFFICE VISIT (OUTPATIENT)
Dept: URGENT CARE | Facility: CLINIC | Age: 49
End: 2024-03-26
Payer: COMMERCIAL

## 2024-03-26 VITALS
RESPIRATION RATE: 18 BRPM | SYSTOLIC BLOOD PRESSURE: 135 MMHG | HEIGHT: 70 IN | HEART RATE: 73 BPM | TEMPERATURE: 97 F | BODY MASS INDEX: 33.5 KG/M2 | WEIGHT: 234 LBS | DIASTOLIC BLOOD PRESSURE: 92 MMHG | OXYGEN SATURATION: 100 %

## 2024-03-26 DIAGNOSIS — S05.02XA ABRASION OF LEFT CORNEA, INITIAL ENCOUNTER: Primary | ICD-10-CM

## 2024-03-26 PROCEDURE — 99214 OFFICE O/P EST MOD 30 MIN: CPT | Mod: 25,S$GLB,,

## 2024-03-26 PROCEDURE — 90715 TDAP VACCINE 7 YRS/> IM: CPT | Mod: S$GLB,,,

## 2024-03-26 PROCEDURE — 90471 IMMUNIZATION ADMIN: CPT | Mod: S$GLB,,,

## 2024-03-26 RX ORDER — OFLOXACIN 3 MG/ML
2 SOLUTION/ DROPS OPHTHALMIC 4 TIMES DAILY
Qty: 5 ML | Refills: 0 | Status: SHIPPED | OUTPATIENT
Start: 2024-03-26 | End: 2024-03-31

## 2024-03-27 NOTE — PROGRESS NOTES
"Dictation #1  MRN:[id number]  [facility]:[id number] Subjective:      Patient ID: Dakotah Braun is a 48 y.o. male.    Vitals:  height is 5' 10" (1.778 m) and weight is 106.1 kg (234 lb). His oral temperature is 97.4 °F (36.3 °C). His blood pressure is 135/92 (abnormal) and his pulse is 73. His respiration is 18 and oxygen saturation is 100%.     Chief Complaint: Eye Problem    In clinic with a chief complaint of left eye irritation and drainage.  Symptoms started on Sunday.  States prior to symptoms beginning he was using chemicals.  He thought he cleaned hands thoroughly, but whenever he touched his eye he incidentally felt tingling sensation.  Has gritty sensation of foreign body.  Denies drainage from eye.  Endorses photophobia.  Mild headaches.  No nausea, no vomiting.  Mild visual acuity status change.  He does wear corrective lenses.  Denies contact use.    Eye Problem   The left eye is affected. This is a new problem. The current episode started in the past 7 days. The problem occurs constantly. The problem has been unchanged. There was no injury mechanism. The pain is at a severity of 2/10. The pain is mild. There is No known exposure to pink eye. He Does not wear contacts. Associated symptoms include blurred vision, eye redness, a foreign body sensation, itching and photophobia. Pertinent negatives include no eye discharge, nausea, recent URI or vomiting. He has tried nothing for the symptoms. The treatment provided no relief.       Eyes:  Positive for foreign body in eye, eye itching, eye pain, eye redness, photophobia and blurred vision. Negative for eye trauma and eye discharge.   Gastrointestinal:  Negative for nausea and vomiting.   Allergic/Immunologic: Negative for immunizations up-to-date.      Objective:     Physical Exam   Constitutional: He is oriented to person, place, and time. He appears well-developed. He is cooperative.   HENT:   Head: Normocephalic and atraumatic.   Ears:   Right Ear: Hearing and " external ear normal.   Left Ear: Hearing and external ear normal.   Nose: Nose normal. No mucosal edema or nasal deformity. No epistaxis. Right sinus exhibits no maxillary sinus tenderness and no frontal sinus tenderness. Left sinus exhibits no maxillary sinus tenderness and no frontal sinus tenderness.   Mouth/Throat: Uvula is midline, oropharynx is clear and moist and mucous membranes are normal. Mucous membranes are moist. No trismus in the jaw. Normal dentition. No uvula swelling. Oropharynx is clear.   Eyes: Right eye visual fields normal and left eye visual fields normal. Lids are normal. Pupils are equal, round, and reactive to light. Lids are everted and swept, no foreign bodies found. No visual field deficit is present. Left eye exhibits exudate. Left conjunctiva is injected. Left eye exhibits normal extraocular motion and no nystagmus.   Slit lamp exam:       The left eye shows corneal abrasion and fluorescein uptake. vision grossly intact gaze aligned appropriately   Neck: Trachea normal and phonation normal. Neck supple.   Cardiovascular: Normal rate, regular rhythm, normal heart sounds and normal pulses.   Pulmonary/Chest: Effort normal and breath sounds normal.   Abdominal: Normal appearance.   Musculoskeletal: Normal range of motion.         General: Normal range of motion.   Neurological: no focal deficit. He is alert, oriented to person, place, and time and at baseline. He exhibits normal muscle tone.   Skin: Skin is warm, dry and intact. Capillary refill takes 2 to 3 seconds.   Psychiatric: His speech is normal and behavior is normal. Mood, judgment and thought content normal.   Nursing note and vitals reviewed.      Assessment:     1. Abrasion of left cornea, initial encounter        Plan:       Abrasion of left cornea, initial encounter  -     (In Office Administered) Tdap Vaccine  -     ofloxacin (OCUFLOX) 0.3 % ophthalmic solution; Place 2 drops into the left eye 4 (four) times daily. for 5  days  Dispense: 5 mL; Refill: 0

## 2024-05-08 ENCOUNTER — OFFICE VISIT (OUTPATIENT)
Dept: URGENT CARE | Facility: CLINIC | Age: 49
End: 2024-05-08
Payer: COMMERCIAL

## 2024-05-08 VITALS
TEMPERATURE: 98 F | HEART RATE: 60 BPM | OXYGEN SATURATION: 99 % | BODY MASS INDEX: 33.58 KG/M2 | SYSTOLIC BLOOD PRESSURE: 143 MMHG | RESPIRATION RATE: 16 BRPM | WEIGHT: 234 LBS | DIASTOLIC BLOOD PRESSURE: 94 MMHG

## 2024-05-08 DIAGNOSIS — B96.89 ACUTE BACTERIAL SINUSITIS: Primary | ICD-10-CM

## 2024-05-08 DIAGNOSIS — J01.90 ACUTE BACTERIAL SINUSITIS: Primary | ICD-10-CM

## 2024-05-08 DIAGNOSIS — R09.81 SINUS CONGESTION: ICD-10-CM

## 2024-05-08 PROCEDURE — 99214 OFFICE O/P EST MOD 30 MIN: CPT | Mod: S$GLB,,,

## 2024-05-08 RX ORDER — FLUTICASONE PROPIONATE 50 MCG
1 SPRAY, SUSPENSION (ML) NASAL DAILY
Qty: 16 ML | Refills: 0 | Status: SHIPPED | OUTPATIENT
Start: 2024-05-08

## 2024-05-08 RX ORDER — AZITHROMYCIN 250 MG/1
TABLET, FILM COATED ORAL
Qty: 6 EACH | Refills: 0 | Status: SHIPPED | OUTPATIENT
Start: 2024-05-08 | End: 2024-05-13

## 2024-05-08 NOTE — PATIENT INSTRUCTIONS
Symptomatic treatment to include:     Rest, increase fluid intake to include electrolyte replacement  Ibuprofen/Tylenol as directed for fever, sore throat, body aches  Zrytec, astelin, and flonase for sinus symptoms  Warm, salt water gargles, over the counter throat lozenges or sprays as desires.   ER for difficulty breathing not relieved by rest, excessive lethargy and/or change in mental status

## 2024-05-08 NOTE — PROGRESS NOTES
Subjective:      Patient ID: Dakotah Braun is a 48 y.o. male.    Vitals:  weight is 106.1 kg (234 lb). His oral temperature is 97.9 °F (36.6 °C). His blood pressure is 143/94 (abnormal) and his pulse is 60. His respiration is 16 and oxygen saturation is 99%.     Chief Complaint: Sinusitis    Sinusitis  This is a chronic problem. The current episode started 1 to 4 weeks ago (3 weeks). The problem has been gradually worsening (pt states that he is now experiencing post nasal drip) since onset. There has been no fever. The fever has been present for Less than 1 day. Associated symptoms include congestion, coughing, headaches and sinus pressure. Pertinent negatives include no chills, ear pain, sneezing or sore throat. Past treatments include nothing (Anila seltzer plus/ Severe sinus medicine). The treatment provided no relief.       Constitution: Negative for chills, fatigue and fever.   HENT:  Positive for congestion, postnasal drip, sinus pain and sinus pressure. Negative for ear pain and sore throat.    Neck: neck negative.   Cardiovascular: Negative.    Respiratory:  Positive for cough.    Gastrointestinal: Negative.    Musculoskeletal: Negative.    Skin: Negative.    Allergic/Immunologic: Negative for sneezing.   Neurological:  Positive for headaches.   Psychiatric/Behavioral: Negative.        Objective:     Physical Exam   Constitutional: He is oriented to person, place, and time. He appears well-developed. He is cooperative.  Non-toxic appearance. He does not appear ill. No distress.   HENT:   Head: Normocephalic and atraumatic.   Ears:   Right Ear: Hearing and external ear normal.   Left Ear: Hearing and external ear normal.   Nose: Rhinorrhea and congestion present. No mucosal edema or nasal deformity. No epistaxis. Right sinus exhibits no maxillary sinus tenderness and no frontal sinus tenderness. Left sinus exhibits no maxillary sinus tenderness and no frontal sinus tenderness.   Mouth/Throat: Uvula is  midline, oropharynx is clear and moist and mucous membranes are normal. Mucous membranes are moist. No trismus in the jaw. Normal dentition. No uvula swelling. No posterior oropharyngeal edema.   Eyes: Conjunctivae and lids are normal. No scleral icterus.   Neck: Trachea normal and phonation normal. Neck supple. No edema present. No erythema present. No neck rigidity present.   Cardiovascular: Normal rate, regular rhythm and normal heart sounds.   Pulmonary/Chest: Effort normal and breath sounds normal. No respiratory distress. He has no decreased breath sounds.   Abdominal: Normal appearance.   Musculoskeletal: Normal range of motion.         General: No deformity. Normal range of motion.   Neurological: He is alert and oriented to person, place, and time. He displays no weakness. He exhibits normal muscle tone.   Skin: Skin is warm, dry, intact, not diaphoretic and not pale.   Psychiatric: His speech is normal and behavior is normal. Mood, judgment and thought content normal.   Nursing note and vitals reviewed.      Assessment:     1. Acute bacterial sinusitis    2. Sinus congestion        Plan:       Acute bacterial sinusitis  -     azithromycin (Z-MORENITA) 250 MG tablet; Take 2 tablets by mouth on day 1; Take 1 tablet by mouth on days 2-5  Dispense: 6 each; Refill: 0    Sinus congestion  -     fluticasone propionate (FLONASE) 50 mcg/actuation nasal spray; 1 spray (50 mcg total) by Each Nostril route once daily.  Dispense: 16 mL; Refill: 0      Discussed medication with patient who acknowledges understanding and is agreeable to POC. Follow up with primary care. Increase fluid intake. Red flags for ER discussed.

## 2024-10-25 ENCOUNTER — PATIENT MESSAGE (OUTPATIENT)
Dept: RESEARCH | Facility: HOSPITAL | Age: 49
End: 2024-10-25
Payer: COMMERCIAL